# Patient Record
Sex: FEMALE | Race: WHITE | NOT HISPANIC OR LATINO | Employment: FULL TIME | URBAN - METROPOLITAN AREA
[De-identification: names, ages, dates, MRNs, and addresses within clinical notes are randomized per-mention and may not be internally consistent; named-entity substitution may affect disease eponyms.]

---

## 2017-05-02 ENCOUNTER — ALLSCRIPTS OFFICE VISIT (OUTPATIENT)
Dept: OTHER | Facility: OTHER | Age: 32
End: 2017-05-02

## 2017-08-24 ENCOUNTER — ALLSCRIPTS OFFICE VISIT (OUTPATIENT)
Dept: OTHER | Facility: OTHER | Age: 32
End: 2017-08-24

## 2017-08-24 LAB — S PYO AG THROAT QL: NEGATIVE

## 2018-01-12 VITALS
RESPIRATION RATE: 16 BRPM | SYSTOLIC BLOOD PRESSURE: 118 MMHG | HEIGHT: 63 IN | BODY MASS INDEX: 37.74 KG/M2 | TEMPERATURE: 96.7 F | HEART RATE: 76 BPM | WEIGHT: 213 LBS | DIASTOLIC BLOOD PRESSURE: 72 MMHG

## 2018-01-13 ENCOUNTER — ALLSCRIPTS OFFICE VISIT (OUTPATIENT)
Dept: OTHER | Facility: OTHER | Age: 33
End: 2018-01-13

## 2018-01-14 VITALS
BODY MASS INDEX: 35.97 KG/M2 | WEIGHT: 203 LBS | DIASTOLIC BLOOD PRESSURE: 80 MMHG | HEART RATE: 82 BPM | TEMPERATURE: 97.2 F | HEIGHT: 63 IN | RESPIRATION RATE: 18 BRPM | SYSTOLIC BLOOD PRESSURE: 124 MMHG

## 2018-01-14 NOTE — PROGRESS NOTES
Assessment   1  Acute otitis media, unspecified otitis media type (382 9) (H66 90)   2  Other infective acute otitis externa, unspecified laterality (380 10) (H60 399)    Plan   Acute otitis media, unspecified otitis media type, Other infective acute otitis externa,    unspecified laterality    · Cefdinir 300 MG Oral Capsule; TAKE 2 CAPSULES DAILY    Discussion/Summary   The patient was counseled regarding risk factor reductions,-- impressions,-- risks and benefits of treatment options  Possible side effects of new medications were reviewed with the patient/guardian today  The treatment plan was reviewed with the patient/guardian  The patient/guardian understands and agrees with the treatment plan      Provider Comments   Provider Comments:    omnicef for OM and OE coverage dry if no better D for some ETD improved after flush      Chief Complaint   Ears clogged and painful  She is taking drops and clarithromycin from the doctors in  History of Present Illness   HPI: 2 [facility] visit for throat infection, better in throat area ear drops, left b/l, canal infection, no better fever lately np hear well n/v/wheeze/cp          Review of Systems        Constitutional: not feeling poorly-- and-- not feeling tired  Neurological: no fainting  Active Problems   1  Adult BMI 35 0-35 9 kg/sq m (V85 35) (Z68 35)   2  Well adult on routine health check (V70 0) (Z00 00)    Past Medical History   1  Acute maxillary sinusitis (461 0) (J01 00)   2  Acute pharyngitis (462) (J02 9)   3  History of Acute upper respiratory infection (465 9) (J06 9)   4  History of ear pain (V12 49) (Z86 69)   5  History of impacted cerumen (V12 49) (Z86 69)   6  History of ovarian cyst (V13 29) (Z87 42)   7  History of pregnancy (V13 29)   8  History of Other infective otitis externa (380 10) (H60 399)    Social History    · Never a smoker  The social history was reviewed and updated today  Surgical History   1   Denied: History of Reported Prior Surgical / Procedural History    Current Meds    1  Ortho Tri-Cyclen Lo 0 18/0 215/0 25 MG-25 MCG Oral Tablet; TAKE 1 TABLET DAILY; Therapy: 10Jjo2737 to (Renew:92Tgu1919) Recorded    Allergies   1  No Known Drug Allergies    Vitals    Recorded: 49VBH7758 10:59AM   Temperature 97 6 F   Heart Rate 82   Respiration 18   Systolic 966   Diastolic 80   Height 5 ft 3 in   Weight 200 lb    BMI Calculated 35 43   BSA Calculated 1 93     Physical Exam        Constitutional      General appearance: No acute distress, well appearing and well nourished  Eyes      Conjunctiva and lids: No swelling, erythema or discharge  Pupils and irises: Equal, round and reactive to light  Ears, Nose, Mouth, and Throat      External inspection of ears and nose: Normal        Otoscopic examination: Abnormal   The right tympanic membrane was red,-- had a loss of landmarks-- and-- had a diminished light reflex  The left tympanic membrane was red,-- had a loss of landmarks-- and-- had a diminished light reflex  The right external canal was erythematous,-- had desquamation of the epithelium-- and-- had a discharge  The left external canal was erythematous,-- had desquamation of the epithelium-- and-- had a discharge  Nasal mucosa, septum, and turbinates: Normal without edema or erythema  Oropharynx: Normal with no erythema, edema, exudate or lesions  Pulmonary      Respiratory effort: No increased work of breathing or signs of respiratory distress  Auscultation of lungs: Clear to auscultation  Cardiovascular      Auscultation of heart: Normal rate and rhythm, normal S1 and S2, without murmurs  Examination of extremities for edema and/or varicosities: Normal        Carotid pulses: Normal        Abdomen      Abdomen: Non-tender, no masses  Lymphatic      Palpation of lymph nodes in neck: No lymphadenopathy         Musculoskeletal      Gait and station: Normal        Digits and nails: Normal without clubbing or cyanosis  Skin      Skin and subcutaneous tissue: Normal without rashes or lesions  Psychiatric      Mood and affect: Normal           Procedure                    Procedure: ear cleaning due to otorrhea  Indication: tympanic membrane(s) could not be visualized in both ears  Procedure Note: The procedure was performed by the Provider  A otoscope was placed in the ear canal(s) to visualize the ear canal debris  The ear was cleaned by using warm water irrigation-- and-- a wire loop  The procedure was successful  Post-Procedure:      Patient Status: the patient tolerated the procedure well  Complications: there were no complications  Follow-up as needed                 Signatures    Electronically signed by : Manjula Trinidad DO; Jan 13 2018  8:57PM EST                       (Author)

## 2018-01-17 ENCOUNTER — ALLSCRIPTS OFFICE VISIT (OUTPATIENT)
Dept: OTHER | Facility: OTHER | Age: 33
End: 2018-01-17

## 2018-01-18 NOTE — PROGRESS NOTES
Assessment   1  Acute otitis media, unspecified otitis media type (382 9) (H66 90)    Plan   Acute otitis media, unspecified otitis media type, Other infective acute otitis externa,    unspecified laterality    · Cefdinir 300 MG Oral Capsule; TAKE 2 CAPSULES DAILY    Discussion/Summary      Pt advised to finish abx sudafed as directed otc 2 sprays in each nostril in the am   taught [facility]  Chief Complaint   pt c/o ear discomfort in both ears for about a week  on abx but pt states it is still bothering her  ac/cma      History of Present Illness   HPI: pt states she had an ear infection since last Tuesday was seen on sat by a different doc   pt is here to follow up  states its not better      Review of Systems        Constitutional: No fever, no chills, feels well, no tiredness, no recent weight gain or loss  ENT: earache, but-- as noted in HPI  Cardiovascular: no complaints of slow or fast heart rate, no chest pain, no palpitations, no leg claudication or lower extremity edema  Respiratory: no complaints of shortness of breath, no wheezing, no dyspnea on exertion, no orthopnea or PND  Gastrointestinal: no complaints of abdominal pain, no constipation, no nausea or diarrhea, no vomiting, no bloody stools  Genitourinary: no complaints of dysuria, no incontinence, no pelvic pain, no dysmenorrhea, no vaginal discharge or abnormal vaginal bleeding  Musculoskeletal: no complaints of arthralgia, no myalgia, no joint swelling or stiffness, no limb pain or swelling  Active Problems   1  Acute otitis media, unspecified otitis media type (382 9) (H66 90)   2  Adult BMI 35 0-35 9 kg/sq m (V85 35) (Z68 35)   3  Other infective acute otitis externa, unspecified laterality (380 10) (H60 399)   4  Well adult on routine health check (V70 0) (Z00 00)    Past Medical History   1  Acute maxillary sinusitis (461 0) (J01 00)   2  Acute pharyngitis (462) (J02 9)   3   History of Acute upper respiratory infection (465 9) (J06 9)   4  History of ear pain (V12 49) (Z86 69)   5  History of impacted cerumen (V12 49) (Z86 69)   6  History of ovarian cyst (V13 29) (Z87 42)   7  History of pregnancy (V13 29)   8  History of Other infective otitis externa (380 10) (H60 399)  Active Problems And Past Medical History Reviewed: The active problems and past medical history were reviewed and updated today  Family History   Family History Reviewed: The family history was reviewed and updated today  Social History    · Never a smoker  The social history was reviewed and updated today  Surgical History   1  Denied: History of Reported Prior Surgical / Procedural History  Surgical History Reviewed: The surgical history was reviewed and updated today  Current Meds    1  Cefdinir 300 MG Oral Capsule; TAKE 2 CAPSULES DAILY; Therapy: 34NJO2653 to [phone])  Requested for: 51SMK6640; Last     Rx:13Jan2018 Ordered   2  Ortho Tri-Cyclen Lo 0 18/0 215/0 25 MG-25 MCG Oral Tablet; TAKE 1 TABLET DAILY; Therapy: 90Rcp0545 to (Renew:05Nuo5506) Recorded     The medication list was reviewed and updated today  Allergies   1  No Known Drug Allergies    Vitals    Recorded: 59HJA8021 07:11PM   Temperature 97 4 F   Heart Rate 82   Respiration 20   Systolic 318   Diastolic 76   Height 5 ft 3 in   Weight 203 lb    BMI Calculated 35 96   BSA Calculated 1 95     Physical Exam        Constitutional      General appearance: No acute distress, well appearing and well nourished  Eyes      Conjunctiva and lids: No swelling, erythema or discharge  Pupils and irises: Equal, round and reactive to light  Ears, Nose, Mouth, and Throat      Otoscopic examination: Abnormal   The right tympanic membrane was red,-- was bulging-- and-- had decreased mobility  The left tympanic membrane was red,-- was bulging-- and-- had decreased mobility        Oropharynx: Abnormal   The posterior pharynx did not have an exudate  There was enlargement of both tonsils  Pulmonary      Auscultation of lungs: Clear to auscultation  Cardiovascular      Auscultation of heart: Normal rate and rhythm, normal S1 and S2, without murmurs  Abdomen      Abdomen: Non-tender, no masses            Signatures    Electronically signed by : Venice Swenson DO; Jan 17 2018  7:40PM EST                       (Author)

## 2018-01-22 VITALS
WEIGHT: 200 LBS | DIASTOLIC BLOOD PRESSURE: 80 MMHG | HEART RATE: 82 BPM | TEMPERATURE: 97.6 F | HEIGHT: 63 IN | RESPIRATION RATE: 18 BRPM | BODY MASS INDEX: 35.44 KG/M2 | SYSTOLIC BLOOD PRESSURE: 120 MMHG

## 2018-01-23 VITALS
HEIGHT: 63 IN | HEART RATE: 82 BPM | TEMPERATURE: 97.4 F | BODY MASS INDEX: 35.97 KG/M2 | DIASTOLIC BLOOD PRESSURE: 76 MMHG | WEIGHT: 203 LBS | RESPIRATION RATE: 20 BRPM | SYSTOLIC BLOOD PRESSURE: 130 MMHG

## 2018-12-23 ENCOUNTER — AMB VIDEO VISIT (OUTPATIENT)
Dept: OTHER | Facility: HOSPITAL | Age: 33
End: 2018-12-23

## 2018-12-23 ENCOUNTER — AMB VIDEO VISIT (OUTPATIENT)
Dept: URGENT CARE | Facility: CLINIC | Age: 33
End: 2018-12-23

## 2018-12-23 DIAGNOSIS — K52.9 NONINFECTIOUS GASTROENTERITIS, UNSPECIFIED TYPE: Primary | ICD-10-CM

## 2018-12-23 PROCEDURE — EVISIT: Performed by: PHYSICIAN ASSISTANT

## 2018-12-23 RX ORDER — ONDANSETRON 4 MG/1
4 TABLET, FILM COATED ORAL EVERY 8 HOURS PRN
Qty: 20 TABLET | Refills: 0 | Status: SHIPPED | OUTPATIENT
Start: 2018-12-23 | End: 2019-04-03 | Stop reason: ALTCHOICE

## 2018-12-23 NOTE — PROGRESS NOTES
[facility]        NAME: Benedict Dandy is a 34 y o  female  : 1985    MRN: [id number]  DATE: 2019  TIME: 6:20 PM    Assessment and Plan   Noninfectious gastroenteritis, unspecified type [K52 9]  1  Noninfectious gastroenteritis, unspecified type  ondansetron (ZOFRAN) 4 mg tablet         Patient Instructions   Gastroenteritis:   -The patient is likely suffering from a "stomach flu" will prescribe zofran 4mg to be taken as needed every 8 hours for nausea  We discussed progression of her diet  She should stay well hydrated and stick to clear liquids until her symptoms improve and can then slowly progress her diet as tolerated  We discussed that if she experiences worsening symptoms, dizziness, abdominal pain, fever or chills she should go immediately to the ED  She verbalizes understanding and agrees with the treatment plan  Follow up with PCP in 3-5 days  Proceed to  ER if symptoms worsen  Chief Complaint   No chief complaint on file  History of Present Illness       The patient presents today for a 6 hour hx of nausea, vomiting and diarrhea  She states that last night around 3am she began to experience nausea and began to vomit  She states that since then she has had three episodes of diarrhea and over ten episodes of vomiting  She states that she has been unable to tolerate PO intake  She denies fever, chills, hemoptysis, melena, hematochezia, abdominal pains, dizziness, weakness  She has not tried OTC interventions  She denies sick contacts  She states that she has no PMH and is currently on birth control  She denies drug allergies  Review of Systems   Review of Systems   Constitutional: Positive for appetite change  Negative for activity change, chills, diaphoresis, fatigue and fever  HENT: Negative  Eyes: Negative for visual disturbance  Respiratory: Negative for cough, choking, chest tightness, shortness of breath, wheezing and stridor  Cardiovascular: Negative for chest pain, palpitations and leg swelling  Gastrointestinal: Positive for diarrhea, nausea and vomiting  Negative for abdominal distention, abdominal pain, anal bleeding, blood in stool, constipation and rectal pain  Genitourinary: Negative for dysuria, enuresis, flank pain, frequency, menstrual problem, pelvic pain, vaginal bleeding, vaginal discharge and vaginal pain  Musculoskeletal: Negative for arthralgias, myalgias, neck pain and neck stiffness  Skin: Negative for rash  Allergic/Immunologic: Negative for immunocompromised state  Neurological: Negative for dizziness, weakness, light-headedness and numbness  Hematological: Negative for adenopathy  Does not bruise/bleed easily  Current Medications       Current Outpatient Prescriptions:     ondansetron (ZOFRAN) 4 mg tablet, Take 1 tablet (4 mg total) by mouth every 8 (eight) hours as needed for nausea or vomiting, Disp: 20 tablet, Rfl: 0    Current Allergies     Allergies as of 12/23/2018    (No Known Allergies)            The following portions of the patient's history were reviewed and updated as appropriate: allergies, current medications, past family history, past medical history, past social history, past surgical history and problem list      No past medical history on file  No past surgical history on file  No family history on file  Medications have been verified  Objective   There were no vitals taken for this visit  Physical Exam     Physical Exam   Constitutional: She appears well-developed and well-nourished  No distress  Abdominal:   Patient was informed to palpate all four quadrants of her belly with deep pressure and states there is no abdominal tenderness   Skin: She is not diaphoretic

## 2018-12-23 NOTE — CARE ANYWHERE EVISITS
Visit Summary for Estrada Powell - Gender: Female - Date of Birth: [id number]  Date: [id number] - Duration: 5 minutes  Patient: Estrada Powell  Provider: Barb Rodrigues PA-C    Patient Contact Information  Address  [address]; [facility] [place] [facility] [place] [address]  [phone]    Visit Topics  throwing up/stomach issues all night [Added By: Self - 2018-12-23]    Conversation Transcripts     [Notification] You are connected with Barb Rodrigues PA-C, Physician Assistant  [Notification] Estrada Powell is located in [place]  [Notification] Estrada Powell has shared health history         Diagnosis    Procedures  Value: 79905 Code: CPT-4 UNLISTED E&M SERVICE    Medications Prescribed    No prescriptions ordered    Electronically signed by: Tatiana Lopez(NPI [id number])

## 2018-12-23 NOTE — PATIENT INSTRUCTIONS
Acute Nausea and Vomiting   AMBULATORY CARE:   Acute nausea and vomiting  starts suddenly, gets worse quickly, and lasts a short time  Common causes include pregnancy, alcohol, infection, and medicines  A head injury, heart attack, or inner ear imbalance can also cause acute nausea and vomiting  Seek care immediately if:   · You see blood in your vomit or your bowel movements  · You have sudden, severe pain in your chest and upper abdomen after hard vomiting or retching  · You have swelling in your neck and chest      · You are dizzy, cold, and thirsty and your eyes and mouth are dry  · You are urinating very little or not at all  · You have muscle weakness, leg cramps, and trouble breathing  · Your heart is beating much faster than normal      · You continue to vomit for more than 48 hours  Contact your healthcare provider if:   · You have frequent dry heaves (vomiting but nothing comes out)  · Your nausea and vomiting does not get better or go away after you use medicine  · You have questions or concerns about your condition or treatment  Treatment for acute nausea and vomiting  may include medicines to calm your stomach and stop the vomiting  You may need IV fluids if you are dehydrated  Prevent or manage acute nausea and vomiting:   · Do not drink alcohol  Alcohol may upset or irritate your stomach  Too much alcohol can also cause acute nausea and vomiting  · Control stress  Headaches due to stress may cause nausea and vomiting  Find ways to relax and manage your stress  Get more rest and sleep  · Drink more liquids as directed  Vomiting can lead to dehydration  It is important to drink more liquids to help replace lost body fluids  Ask your healthcare provider how much liquid to drink each day and which liquids are best for you  Your provider may recommend that you drink an oral rehydration solution (ORS)   ORS contains water, salts, and sugar that are needed to replace the lost body fluids  Ask what kind of ORS to use, how much to drink, and where to get it  · Eat smaller meals, more often  Eat small amounts of food every 2 to 3 hours, even if you are not hungry  Food in your stomach may decrease your nausea  · Talk to your healthcare provider before you take over-the-counter (OTC) medicines  These medicines can cause serious problems if you use certain other medicines, or you have a medical condition  You may have problems if you use too much or use them for longer than the label says  Follow directions on the label carefully  Follow up with your healthcare provider as directed:  Write down your questions so you remember to ask them during your visits  © 2017 [address] Information is for End User's use only and may not be sold, redistributed or otherwise used for commercial purposes  All illustrations and images included in CareNotes® are the copyrighted property of A D [employer] M [employer] Inc  or Fabian Gill  The above information is an  only  It is not intended as medical advice for individual conditions or treatments  Talk to your doctor, nurse or pharmacist before following any medical regimen to see if it is safe and effective for you  Gastroenteritis:   -The patient is likely suffering from a "stomach flu" will prescribe zofran 4mg to be taken as needed every 8 hours for nausea  We discussed progression of her diet  She should stay well hydrated and stick to clear liquids until her symptoms improve and can then slowly progress her diet as tolerated  We discussed that if she experiences worsening symptoms, dizziness, abdominal pain, fever or chills she should go immediately to the ED  She verbalizes understanding and agrees with the treatment plan

## 2019-02-25 ENCOUNTER — OFFICE VISIT (OUTPATIENT)
Dept: FAMILY MEDICINE CLINIC | Facility: CLINIC | Age: 34
End: 2019-02-25
Payer: COMMERCIAL

## 2019-02-25 VITALS
BODY MASS INDEX: 35.44 KG/M2 | HEART RATE: 66 BPM | HEIGHT: 64 IN | DIASTOLIC BLOOD PRESSURE: 70 MMHG | WEIGHT: 207.6 LBS | TEMPERATURE: 98.3 F | RESPIRATION RATE: 16 BRPM | SYSTOLIC BLOOD PRESSURE: 132 MMHG

## 2019-02-25 DIAGNOSIS — J02.9 ACUTE PHARYNGITIS, UNSPECIFIED ETIOLOGY: Primary | ICD-10-CM

## 2019-02-25 LAB — S PYO AG THROAT QL: NEGATIVE

## 2019-02-25 PROCEDURE — 99213 OFFICE O/P EST LOW 20 MIN: CPT | Performed by: NURSE PRACTITIONER

## 2019-02-25 PROCEDURE — 3008F BODY MASS INDEX DOCD: CPT | Performed by: NURSE PRACTITIONER

## 2019-02-25 PROCEDURE — 87880 STREP A ASSAY W/OPTIC: CPT | Performed by: NURSE PRACTITIONER

## 2019-02-25 RX ORDER — NORGESTIMATE AND ETHINYL ESTRADIOL 7DAYSX3 LO
1 KIT ORAL DAILY
COMMUNITY
End: 2021-12-09

## 2019-02-25 RX ORDER — AMOXICILLIN 875 MG/1
875 TABLET, COATED ORAL 2 TIMES DAILY
Qty: 20 TABLET | Refills: 0 | Status: SHIPPED | OUTPATIENT
Start: 2019-02-25 | End: 2019-03-07

## 2019-02-25 NOTE — PATIENT INSTRUCTIONS
Take medication with food  It is important that you take the entire course of antibiotics prescribed  May also take a probiotic of your choice to maintain healthy GI khalida  Can take some probiotic and yogurt with the medication  Gargle with warm salt water for 5 minutes every 4 hours  Drink plenty of fluids at least 6 glasses of water a day  Can use some honey lemon tea  Call or follow up if symptoms are not better in 7 days  Supportive care discussed and advised  Follow up for no improvement and worsening of conditions  Patient advised and educated when to see immediate medical care

## 2019-02-25 NOTE — PROGRESS NOTES
Assessment/Plan:         Diagnoses and all orders for this visit:    Acute pharyngitis, unspecified etiology  -     amoxicillin (AMOXIL) 875 mg tablet; Take 1 tablet (875 mg total) by mouth 2 (two) times a day for 10 days  -     POCT rapid strepA    Other orders  -     norgestimate-ethinyl estradiol (ORTHO TRI-CYCLEN LO) 0 18/0 215/0 25 MG-25 MCG per tablet; Take 1 tablet by mouth daily          BMI Counseling: Body mass index is 35 63 kg/m²  Discussed the patient's BMI with her  The BMI is above average  BMI counseling and education was provided to the patient  Nutrition recommendations include reducing portion sizes, decreasing overall calorie intake, 3-5 servings of fruits/vegetables daily, reducing fast food intake, reducing intake of saturated fat and trans fat and reducing intake of cholesterol  Patient Instructions: Take medication with food  It is important that you take the entire course of antibiotics prescribed  May also take a probiotic of your choice to maintain healthy GI khalida  Can take some probiotic and yogurt with the medication  Gargle with warm salt water for 5 minutes every 4 hours  Drink plenty of fluids at least 6 glasses of water a day  Can use some honey lemon tea  Call or follow up if symptoms are not better in 7 days  Supportive care discussed and advised  Follow up for no improvement and worsening of conditions  Patient advised and educated when to see immediate medical care  Return if symptoms worsen or fail to improve  Recent Results (from the past 24 hour(s))   POCT rapid strepA    Collection Time: 02/25/19  2:12 PM   Result Value Ref Range     RAPID STREP A Negative Negative         Subjective:      Patient ID: Elisa Vee is a 34 y o  female  Chief Complaint   Patient presents with    Sore Throat     started on Saturday    jmcma    Earache     Rt side       HPI  Patient stated that started with sore throat on 2/23 and stated that symptoms getting worse  Stated that sore throat progressed to right earache  Denies fever, chills, and sob  The following portions of the patient's history were reviewed and updated as appropriate: allergies, current medications, past family history, past medical history, past social history, past surgical history and problem list       Review of Systems   Constitutional: Negative for chills, fatigue and fever  HENT: Positive for ear pain and sore throat  Negative for congestion, ear discharge, facial swelling, hearing loss, mouth sores, nosebleeds, postnasal drip, rhinorrhea, sinus pressure, sinus pain, sneezing, trouble swallowing and voice change  Respiratory: Negative for cough, chest tightness, shortness of breath and wheezing  Cardiovascular: Negative  Gastrointestinal: Negative for abdominal pain, constipation, diarrhea and nausea  Neurological: Negative for dizziness, weakness, light-headedness and headaches  Objective:    Social History     Tobacco Use   Smoking Status Never Smoker   Smokeless Tobacco Never Used       Allergies: No Known Allergies    Vitals:  /70   Pulse 66   Temp 98 3 °F (36 8 °C)   Resp 16   Ht 5' 4" (1 626 m)   Wt 94 2 kg (207 lb 9 6 oz)   LMP 02/04/2019 (Approximate)   BMI 35 63 kg/m²     Current Outpatient Medications   Medication Sig Dispense Refill    norgestimate-ethinyl estradiol (ORTHO TRI-CYCLEN LO) 0 18/0 215/0 25 MG-25 MCG per tablet Take 1 tablet by mouth daily      amoxicillin (AMOXIL) 875 mg tablet Take 1 tablet (875 mg total) by mouth 2 (two) times a day for 10 days 20 tablet 0    ondansetron (ZOFRAN) 4 mg tablet Take 1 tablet (4 mg total) by mouth every 8 (eight) hours as needed for nausea or vomiting (Patient not taking: Reported on 2/25/2019) 20 tablet 0     No current facility-administered medications for this visit  Physical Exam   Constitutional: She is oriented to person, place, and time   She appears well-developed and well-nourished  HENT:   Head: Normocephalic  Right Ear: External ear and ear canal normal  Tympanic membrane is bulging  Tympanic membrane is not retracted  Left Ear: Tympanic membrane, external ear and ear canal normal  Tympanic membrane is not retracted  Nose: Nose normal  Right sinus exhibits no maxillary sinus tenderness and no frontal sinus tenderness  Left sinus exhibits no maxillary sinus tenderness and no frontal sinus tenderness  Mouth/Throat: Mucous membranes are normal  Posterior oropharyngeal erythema present  Neck: Neck supple  Cardiovascular: Normal rate, regular rhythm and normal heart sounds  Pulmonary/Chest: Effort normal and breath sounds normal    Abdominal: Normal appearance and bowel sounds are normal  There is no hepatosplenomegaly  There is no tenderness  There is no rebound  Musculoskeletal: Normal range of motion  Lymphadenopathy:     She has cervical adenopathy  Right cervical: Superficial cervical adenopathy present  No posterior cervical adenopathy present  Left cervical: Superficial cervical adenopathy present  No posterior cervical adenopathy present  Neurological: She is alert and oriented to person, place, and time  Skin: Skin is warm and dry  Psychiatric: She has a normal mood and affect  Her behavior is normal  Judgment and thought content normal    Vitals reviewed                    CALLIE Brandon

## 2019-03-19 ENCOUNTER — OFFICE VISIT (OUTPATIENT)
Dept: FAMILY MEDICINE CLINIC | Facility: CLINIC | Age: 34
End: 2019-03-19
Payer: COMMERCIAL

## 2019-03-19 VITALS
BODY MASS INDEX: 35.17 KG/M2 | HEIGHT: 64 IN | SYSTOLIC BLOOD PRESSURE: 130 MMHG | HEART RATE: 72 BPM | DIASTOLIC BLOOD PRESSURE: 74 MMHG | TEMPERATURE: 98.2 F | WEIGHT: 206 LBS | RESPIRATION RATE: 16 BRPM

## 2019-03-19 DIAGNOSIS — J02.9 SORE THROAT: Primary | ICD-10-CM

## 2019-03-19 DIAGNOSIS — R09.82 POST-NASAL DRIP: ICD-10-CM

## 2019-03-19 PROCEDURE — 99213 OFFICE O/P EST LOW 20 MIN: CPT | Performed by: NURSE PRACTITIONER

## 2019-03-19 RX ORDER — FLUTICASONE PROPIONATE 50 MCG
2 SPRAY, SUSPENSION (ML) NASAL DAILY
Qty: 16 G | Refills: 2 | Status: SHIPPED | OUTPATIENT
Start: 2019-03-19 | End: 2020-06-09 | Stop reason: ALTCHOICE

## 2019-03-19 NOTE — PROGRESS NOTES
Assessment/Plan:  Post nasal drip and acid reflux in differential and discussed medications how to use and if no improvement, will follow up with ENT  Start flonase and if symptoms improved, will continue that  If after starting flonase, symptoms not improved, will start zantac or pepcid in morning empty stomach  If still symptoms not resolved with above, will follow up with ENT  Diagnoses and all orders for this visit:    Sore throat  -     fluticasone (FLONASE) 50 mcg/act nasal spray; 2 sprays into each nostril daily  -     Ambulatory Referral to Otolaryngology; Future    Post-nasal drip  -     fluticasone (FLONASE) 50 mcg/act nasal spray; 2 sprays into each nostril daily  -     Ambulatory Referral to Otolaryngology; Future         Patient Instructions:  Start flonase and if symptoms improved, will continue that  If after starting flonase, symptoms not improved, will start zantac or pepcid in morning empty stomach  If still symptoms not resolved with above, will follow up with ENT  Supportive care discussed and advised  Follow up for no improvement and worsening of conditions  Patient advised and educated when to see immediate medical care  Return if symptoms worsen or fail to improve  Subjective:      Patient ID: Dena Giang is a 34 y o  female  Chief Complaint   Patient presents with    Sore Throat     still not cleared up-[facility]  Patient was seen in office on 3/25 for acute pharyngitis and stated that feeling much better but still having lingering sore throat  Stated that it was worse 2 days ago and feeling better today  Stated that swallowing lot of saliva  Had h/o acid reflux when she was pregnant  Denies fever, sob, chest pain and swallowing difficulty  Denies any weight loss      Vitals:  /74   Pulse 72   Temp 98 2 °F (36 8 °C)   Resp 16   Ht 5' 4" (1 626 m)   Wt 93 4 kg (206 lb)   BMI 35 36 kg/m²     The following portions of the patient's history were reviewed and updated as appropriate: allergies, current medications, past family history, past medical history, past social history, past surgical history and problem list       Review of Systems   Constitutional: Negative for activity change, appetite change, chills, diaphoresis, fatigue, fever and unexpected weight change  HENT: Positive for sore throat  Negative for congestion, ear discharge, ear pain, facial swelling, hearing loss, mouth sores, nosebleeds, postnasal drip, rhinorrhea, sinus pressure, sinus pain, sneezing, trouble swallowing and voice change  Respiratory: Negative for cough, chest tightness, shortness of breath and wheezing  Cardiovascular: Negative  Gastrointestinal: Negative for abdominal pain, constipation, diarrhea and nausea  Genitourinary: Negative  Musculoskeletal: Negative  Skin: Negative  Neurological: Negative for dizziness, weakness, light-headedness and headaches  Psychiatric/Behavioral: Negative  Objective:    Social History     Tobacco Use   Smoking Status Never Smoker   Smokeless Tobacco Never Used       Allergies: No Known Allergies      Current Outpatient Medications   Medication Sig Dispense Refill    norgestimate-ethinyl estradiol (ORTHO TRI-CYCLEN LO) 0 18/0 215/0 25 MG-25 MCG per tablet Take 1 tablet by mouth daily      fluticasone (FLONASE) 50 mcg/act nasal spray 2 sprays into each nostril daily 16 g 2    ondansetron (ZOFRAN) 4 mg tablet Take 1 tablet (4 mg total) by mouth every 8 (eight) hours as needed for nausea or vomiting (Patient not taking: Reported on 2/25/2019) 20 tablet 0     No current facility-administered medications for this visit  Physical Exam   Constitutional: She is oriented to person, place, and time  She appears well-developed and well-nourished  HENT:   Head: Normocephalic     Right Ear: Tympanic membrane, external ear and ear canal normal    Left Ear: Tympanic membrane, external ear and ear canal normal    Nose: Nose normal  Right sinus exhibits no maxillary sinus tenderness and no frontal sinus tenderness  Left sinus exhibits no maxillary sinus tenderness and no frontal sinus tenderness  Mouth/Throat: Oropharynx is clear and moist and mucous membranes are normal    Post nasal drip noted without any redness of posterior oropharynx and without any discharge and swelling of tonsils  Right tonsil is larger than left    Neck: Neck supple  Cardiovascular: Normal rate, regular rhythm and normal heart sounds  Pulmonary/Chest: Effort normal and breath sounds normal    Abdominal: Normal appearance and bowel sounds are normal  There is no hepatosplenomegaly  There is no tenderness  There is no rebound  Musculoskeletal: Normal range of motion  Lymphadenopathy:        Right cervical: No superficial cervical and no posterior cervical adenopathy present  Left cervical: No superficial cervical and no posterior cervical adenopathy present  Neurological: She is alert and oriented to person, place, and time  Skin: Skin is warm and dry  Psychiatric: She has a normal mood and affect  Her behavior is normal  Judgment and thought content normal    Vitals reviewed                    CALLIE Iglesias

## 2019-03-19 NOTE — PATIENT INSTRUCTIONS
Start flonase and if symptoms improved, will continue that  If after starting flonase, symptoms not improved, will start zantac or pepcid in morning empty stomach  If still symptoms not resolved with above, will follow up with ENT  Supportive care discussed and advised  Follow up for no improvement and worsening of conditions  Patient advised and educated when to see immediate medical care

## 2019-04-03 ENCOUNTER — OFFICE VISIT (OUTPATIENT)
Dept: FAMILY MEDICINE CLINIC | Facility: CLINIC | Age: 34
End: 2019-04-03
Payer: COMMERCIAL

## 2019-04-03 VITALS
BODY MASS INDEX: 35.34 KG/M2 | TEMPERATURE: 98 F | HEIGHT: 64 IN | WEIGHT: 207 LBS | DIASTOLIC BLOOD PRESSURE: 72 MMHG | RESPIRATION RATE: 16 BRPM | SYSTOLIC BLOOD PRESSURE: 120 MMHG | HEART RATE: 72 BPM

## 2019-04-03 DIAGNOSIS — J01.00 ACUTE NON-RECURRENT MAXILLARY SINUSITIS: Primary | ICD-10-CM

## 2019-04-03 PROCEDURE — 99213 OFFICE O/P EST LOW 20 MIN: CPT | Performed by: FAMILY MEDICINE

## 2019-04-03 PROCEDURE — 3008F BODY MASS INDEX DOCD: CPT | Performed by: FAMILY MEDICINE

## 2019-04-03 PROCEDURE — 1036F TOBACCO NON-USER: CPT | Performed by: FAMILY MEDICINE

## 2019-04-03 RX ORDER — FLUCONAZOLE 150 MG/1
TABLET ORAL
Refills: 0 | COMMUNITY
Start: 2019-04-02 | End: 2019-04-03 | Stop reason: ALTCHOICE

## 2019-04-03 RX ORDER — AZITHROMYCIN 250 MG/1
TABLET, FILM COATED ORAL
Qty: 6 TABLET | Refills: 0 | Status: SHIPPED | OUTPATIENT
Start: 2019-04-03 | End: 2019-04-07

## 2019-11-11 ENCOUNTER — OFFICE VISIT (OUTPATIENT)
Dept: FAMILY MEDICINE CLINIC | Facility: CLINIC | Age: 34
End: 2019-11-11
Payer: COMMERCIAL

## 2019-11-11 VITALS
RESPIRATION RATE: 16 BRPM | WEIGHT: 203 LBS | DIASTOLIC BLOOD PRESSURE: 80 MMHG | SYSTOLIC BLOOD PRESSURE: 112 MMHG | TEMPERATURE: 98 F | HEIGHT: 63 IN | OXYGEN SATURATION: 98 % | HEART RATE: 66 BPM | BODY MASS INDEX: 35.97 KG/M2

## 2019-11-11 DIAGNOSIS — R53.83 FATIGUE, UNSPECIFIED TYPE: ICD-10-CM

## 2019-11-11 DIAGNOSIS — Z13.1 SCREENING FOR DIABETES MELLITUS (DM): ICD-10-CM

## 2019-11-11 DIAGNOSIS — Z23 IMMUNIZATION DUE: ICD-10-CM

## 2019-11-11 DIAGNOSIS — Z13.83 SCREENING FOR CARDIOVASCULAR, RESPIRATORY, AND GENITOURINARY DISEASES: ICD-10-CM

## 2019-11-11 DIAGNOSIS — M79.10 MYALGIA: ICD-10-CM

## 2019-11-11 DIAGNOSIS — R63.5 ABNORMAL WEIGHT GAIN: ICD-10-CM

## 2019-11-11 DIAGNOSIS — R35.1 NOCTURIA: ICD-10-CM

## 2019-11-11 DIAGNOSIS — Z00.00 HEALTHCARE MAINTENANCE: Primary | ICD-10-CM

## 2019-11-11 DIAGNOSIS — Z13.6 SCREENING FOR CARDIOVASCULAR, RESPIRATORY, AND GENITOURINARY DISEASES: ICD-10-CM

## 2019-11-11 DIAGNOSIS — Z13.89 SCREENING FOR CARDIOVASCULAR, RESPIRATORY, AND GENITOURINARY DISEASES: ICD-10-CM

## 2019-11-11 PROBLEM — K52.9 NONINFECTIOUS GASTROENTERITIS: Status: RESOLVED | Noted: 2018-12-23 | Resolved: 2019-11-11

## 2019-11-11 PROCEDURE — 93000 ELECTROCARDIOGRAM COMPLETE: CPT | Performed by: FAMILY MEDICINE

## 2019-11-11 PROCEDURE — 90686 IIV4 VACC NO PRSV 0.5 ML IM: CPT

## 2019-11-11 PROCEDURE — 90471 IMMUNIZATION ADMIN: CPT

## 2019-11-11 PROCEDURE — 99395 PREV VISIT EST AGE 18-39: CPT | Performed by: FAMILY MEDICINE

## 2019-11-11 NOTE — PROGRESS NOTES
Assessment/Plan:    No problem-specific Assessment & Plan notes found for this encounter  cpe done    occas stomach ache, GGT  Post nasal drip and geographic tongue, suspect viral  Labs ordered, cc to [facility] in [place]  EKG normal, fatigue, copy given to pt       Diagnoses and all orders for this visit:    Healthcare maintenance    Fatigue, unspecified type  -     Vitamin D 25 hydroxy; Future  -     CBC and differential; Future  -     TSH, 3rd generation; Future  -     T4, free; Future  -     Gamma GT; Future  -     POCT ECG    Myalgia  -     Phosphorus; Future  -     Magnesium; Future  -     PTH, intact; Future    Abnormal weight gain  -     Hemoglobin A1C; Future    Nocturia  -     Hemoglobin A1C; Future  -     UA w Reflex to Microscopic w Reflex to Culture -Lab Collect; Future  -     Microalbumin / creatinine urine ratio; Future    Screening for diabetes mellitus (DM)  -     Comprehensive metabolic panel; Future    Screening for cardiovascular, respiratory, and genitourinary diseases  -     Lipid Panel with Direct LDL reflex; Future    Immunization due  -     influenza vaccine, 9485-0510, quadrivalent, 0 5 mL, preservative-free, for adult and pediatric patients 6 mos+ (AFLURIA, FLUARIX, FLULAVAL, FLUZONE)        Return if symptoms worsen or fail to improve  Subjective:      Patient ID: Goyo Baum is a 30 y o  female      Chief Complaint   Patient presents with    Physical Exam     vfrma    Sore Throat       HPI    Tries to follow diet, healthy mostly  Brian Crouch to lose wt  Trying to exercise, active at job, lots of walking  On bcp  No herbals  Periods were heavy before bcp  Also frequent  Cramps and aches in legs  Fatigue  Some nocturia  2 4yo child  No GDM  Has gained wt, 50# in last few years     Has had sore throat  About 3d  Spots on tongue, tender, few days  Around child with coxsackie virus  No fever  Some nausea  No diarrhea  No hand rashes    The following portions of the patient's history were reviewed and updated as appropriate: allergies, current medications, past family history, past medical history, past social history, past surgical history and problem list     Review of Systems   Respiratory: Negative for shortness of breath  Cardiovascular: Negative for chest pain  Current Outpatient Medications   Medication Sig Dispense Refill    fluticasone (FLONASE) 50 mcg/act nasal spray 2 sprays into each nostril daily 16 g 2    norgestimate-ethinyl estradiol (ORTHO TRI-CYCLEN LO) 0 18/0 215/0 25 MG-25 MCG per tablet Take 1 tablet by mouth daily       No current facility-administered medications for this visit  Objective:    /80   Pulse 66   Temp 98 °F (36 7 °C)   Resp 16   Ht 5' 3" (1 6 m)   Wt 92 1 kg (203 lb)   LMP 11/09/2019 (Exact Date)   SpO2 98%   BMI 35 96 kg/m²        Physical Exam   Constitutional: She appears well-developed  No distress  HENT:   Head: Normocephalic  Right Ear: External ear normal    Left Ear: External ear normal    Mouth/Throat: No oropharyngeal exudate  psot nasal drip, tongue geographic   Eyes: Conjunctivae are normal  No scleral icterus  Neck: Neck supple  No thyromegaly present  Cardiovascular: Normal rate, regular rhythm and intact distal pulses  Pulmonary/Chest: Effort normal and breath sounds normal  No respiratory distress  She has no wheezes  Abdominal: Soft  Bowel sounds are normal  She exhibits no mass  There is no tenderness  Musculoskeletal: She exhibits no edema or deformity  Neurological: She is alert  Skin: Skin is warm and dry  Capillary refill takes less than 2 seconds  No rash noted  Psychiatric: Her behavior is normal  Thought content normal    Nursing note and vitals reviewed               Isaiah Malave DO

## 2019-11-21 LAB
25(OH)D3+25(OH)D2 SERPL-MCNC: 45.7 NG/ML (ref 30–100)
ALBUMIN SERPL-MCNC: 4.3 G/DL (ref 3.5–5.5)
ALBUMIN/CREAT UR: 2.3 MG/G CREAT (ref 0–30)
ALBUMIN/GLOB SERPL: 1.4 {RATIO} (ref 1.2–2.2)
ALP SERPL-CCNC: 61 IU/L (ref 39–117)
ALT SERPL-CCNC: 9 IU/L (ref 0–32)
APPEARANCE UR: CLEAR
AST SERPL-CCNC: 14 IU/L (ref 0–40)
BACTERIA URNS QL MICRO: NORMAL
BASOPHILS # BLD AUTO: 0 X10E3/UL (ref 0–0.2)
BASOPHILS NFR BLD AUTO: 0 %
BILIRUB SERPL-MCNC: 0.2 MG/DL (ref 0–1.2)
BILIRUB UR QL STRIP: NEGATIVE
BUN SERPL-MCNC: 13 MG/DL (ref 6–20)
BUN/CREAT SERPL: 19 (ref 9–23)
CALCIUM SERPL-MCNC: 9.6 MG/DL (ref 8.7–10.2)
CHLORIDE SERPL-SCNC: 100 MMOL/L (ref 96–106)
CHOLEST SERPL-MCNC: 176 MG/DL (ref 100–199)
CO2 SERPL-SCNC: 26 MMOL/L (ref 20–29)
COLOR UR: YELLOW
CREAT SERPL-MCNC: 0.69 MG/DL (ref 0.57–1)
CREAT UR-MCNC: 141 MG/DL
EOSINOPHIL # BLD AUTO: 0.2 X10E3/UL (ref 0–0.4)
EOSINOPHIL NFR BLD AUTO: 3 %
EPI CELLS #/AREA URNS HPF: NORMAL /HPF (ref 0–10)
ERYTHROCYTE [DISTWIDTH] IN BLOOD BY AUTOMATED COUNT: 13.2 % (ref 12.3–15.4)
GGT SERPL-CCNC: 10 IU/L (ref 0–60)
GLOBULIN SER-MCNC: 3 G/DL (ref 1.5–4.5)
GLUCOSE SERPL-MCNC: 79 MG/DL (ref 65–99)
GLUCOSE UR QL: NEGATIVE
HBA1C MFR BLD: 5.3 % (ref 4.8–5.6)
HCT VFR BLD AUTO: 42.8 % (ref 34–46.6)
HDLC SERPL-MCNC: 59 MG/DL
HGB BLD-MCNC: 14.3 G/DL (ref 11.1–15.9)
HGB UR QL STRIP: NEGATIVE
IMM GRANULOCYTES # BLD: 0 X10E3/UL (ref 0–0.1)
IMM GRANULOCYTES NFR BLD: 0 %
KETONES UR QL STRIP: NEGATIVE
LDLC SERPL CALC-MCNC: 89 MG/DL (ref 0–99)
LEUKOCYTE ESTERASE UR QL STRIP: NEGATIVE
LYMPHOCYTES # BLD AUTO: 2.5 X10E3/UL (ref 0.7–3.1)
LYMPHOCYTES NFR BLD AUTO: 31 %
MAGNESIUM SERPL-MCNC: 2.2 MG/DL (ref 1.6–2.3)
MCH RBC QN AUTO: 29.7 PG (ref 26.6–33)
MCHC RBC AUTO-ENTMCNC: 33.4 G/DL (ref 31.5–35.7)
MCV RBC AUTO: 89 FL (ref 79–97)
MICRO URNS: NORMAL
MICRO URNS: NORMAL
MICROALBUMIN UR-MCNC: 3.2 UG/ML
MICRODELETION SYND BLD/T FISH: NORMAL
MONOCYTES # BLD AUTO: 0.5 X10E3/UL (ref 0.1–0.9)
MONOCYTES NFR BLD AUTO: 6 %
MUCOUS THREADS URNS QL MICRO: PRESENT
NEUTROPHILS # BLD AUTO: 5 X10E3/UL (ref 1.4–7)
NEUTROPHILS NFR BLD AUTO: 60 %
NITRITE UR QL STRIP: NEGATIVE
PH UR STRIP: 6 [PH] (ref 5–7.5)
PHOSPHATE SERPL-MCNC: 2.9 MG/DL (ref 2.5–4.5)
PLATELET # BLD AUTO: 372 X10E3/UL (ref 150–450)
POTASSIUM SERPL-SCNC: 4.7 MMOL/L (ref 3.5–5.2)
PROT SERPL-MCNC: 7.3 G/DL (ref 6–8.5)
PROT UR QL STRIP: NEGATIVE
PTH-INTACT SERPL-MCNC: 24 PG/ML (ref 15–65)
RBC # BLD AUTO: 4.82 X10E6/UL (ref 3.77–5.28)
RBC #/AREA URNS HPF: NORMAL /HPF (ref 0–2)
SL AMB EGFR AFRICAN AMERICAN: 133 ML/MIN/1.73
SL AMB EGFR NON AFRICAN AMERICAN: 116 ML/MIN/1.73
SL AMB URINALYSIS REFLEX: NORMAL
SODIUM SERPL-SCNC: 139 MMOL/L (ref 134–144)
SP GR UR: 1.02 (ref 1–1.03)
T4 FREE SERPL-MCNC: 1 NG/DL (ref 0.82–1.77)
TRIGL SERPL-MCNC: 141 MG/DL (ref 0–149)
TSH SERPL DL<=0.005 MIU/L-ACNC: 1.41 UIU/ML (ref 0.45–4.5)
UROBILINOGEN UR STRIP-ACNC: 0.2 MG/DL (ref 0.2–1)
WBC # BLD AUTO: 8.3 X10E3/UL (ref 3.4–10.8)
WBC #/AREA URNS HPF: NORMAL /HPF (ref 0–5)

## 2020-03-14 ENCOUNTER — AMB VIDEO VISIT (OUTPATIENT)
Dept: URGENT CARE | Facility: CLINIC | Age: 35
End: 2020-03-14

## 2020-03-14 DIAGNOSIS — J01.00 ACUTE NON-RECURRENT MAXILLARY SINUSITIS: Primary | ICD-10-CM

## 2020-03-14 RX ORDER — PSEUDOEPHEDRINE HCL 60 MG/1
60 TABLET ORAL EVERY 6 HOURS PRN
Qty: 30 TABLET | Refills: 0 | Status: SHIPPED | OUTPATIENT
Start: 2020-03-14 | End: 2020-06-09 | Stop reason: ALTCHOICE

## 2020-03-14 RX ORDER — AMOXICILLIN AND CLAVULANATE POTASSIUM 875; 125 MG/1; MG/1
1 TABLET, FILM COATED ORAL EVERY 12 HOURS SCHEDULED
Qty: 14 TABLET | Refills: 0 | Status: SHIPPED | OUTPATIENT
Start: 2020-03-14 | End: 2020-03-21

## 2020-03-14 NOTE — PROGRESS NOTES
Assessment/Plan   Diagnoses and all orders for this visit:    Acute non-recurrent maxillary sinusitis  -     amoxicillin-clavulanate (AUGMENTIN) 875-125 mg per tablet; Take 1 tablet by mouth every 12 (twelve) hours for 7 days  -     pseudoephedrine (SUDAFED) 60 mg tablet; Take 1 tablet (60 mg total) by mouth every 6 (six) hours as needed for congestion     Prescriptions sent to the pharmacy for Augmentin and pseudoephedrine-use as directed  Saline nasal spray several times daily, Flonase in a m , cool mist humidifier, Tylenol/ibuprofen as needed for fever or pain  Subjective   Patient ID: Sedrick Segundo is a 30 y o  female  There were no vitals filed for this visit  HPI  The patient is a 22-year-old female who presents via video visit with cold symptoms that have been present for approximately 1 week  Positive nasal and sinus congestion that has worsened  Maxillary sinus pressure and pain  Negative headache or dizziness  Negative fever or chills  Positive postnasal drip  Negative sore throat  Negative cough  Negative abdominal pain, nausea, vomiting or diarrhea  She states that she has been taking DayQuil for her symptoms without improvement  Review of Systems   Constitutional: Negative for activity change, chills, fatigue and fever  HENT: Positive for congestion, postnasal drip, rhinorrhea, sinus pressure and sinus pain  Negative for ear discharge, ear pain, facial swelling, mouth sores, sneezing, sore throat and trouble swallowing  Eyes: Negative for pain, discharge, redness and itching  Respiratory: Negative for apnea, cough, chest tightness, shortness of breath, wheezing and stridor  Cardiovascular: Negative for chest pain  Gastrointestinal: Negative for abdominal distention, abdominal pain, diarrhea, nausea and vomiting  Genitourinary: Negative for difficulty urinating  Musculoskeletal: Negative for arthralgias and myalgias  Skin: Negative for pallor and rash  Allergic/Immunologic: Negative  Neurological: Negative for dizziness, light-headedness and headaches  Hematological: Negative  Psychiatric/Behavioral: Negative  All other systems reviewed and are negative  Objective   Physical Exam  Well nourished female in no apparent distress  Positive pain with palpation of the bilateral maxillary sinus region

## 2020-03-14 NOTE — CARE ANYWHERE EVISITS
Visit Summary for Ken Esquivel - Gender: Female - Date of Birth: [id number]  Date: [id number] - Duration: 3 minutes  Patient: Ken Esquivel  Provider: Ofe Millan PA-C    Patient Contact Information  Address  [address]; [facility]  [phone]    Visit Topics  sinus pressure, suffiness [Added By: Self - 2020-03-14]    Triage Questions   Have you had any international travel in the last 14 days? Answer []  Have you had any exposure to a known or expected Covid-19 patient in the last 14 days? Answer []  Do you have any immune system compromise or chronic lung disease? Answer   []  Do you have any vulnerable family members in the home (infant, pregnant, cancer, elderly)? Answer []     Conversation Transcripts  [0A][0A] [Notification] Monika Rolon,  Practice Adm, will help you prepare for your visit  She is assisting Ofe Millan PA-C, Urgent [address] Thank you for choosing [facility]  A provider will be with you   shortly  [0A][Notification] Monika Rolon has left the room  [0A][Notification] You are connected with Ofe Millan PA-C, Urgent Care Specialist [0A][Notification] Ken Esquivel is located in [place]  [0A][Notification] Ken Esquivel has   shared health history  Nicole Delgado  [0A]    Diagnosis    Procedures  Value: [id number] Code: CPT-4 UNLISTED E&M SERVICE    Medications Prescribed    No prescriptions ordered    Electronically signed by: Tg De La Cruz(NPI [id number])

## 2020-06-08 ENCOUNTER — AMB VIDEO VISIT (OUTPATIENT)
Dept: OTHER | Facility: HOSPITAL | Age: 35
End: 2020-06-08

## 2020-06-09 ENCOUNTER — TELEMEDICINE (OUTPATIENT)
Dept: FAMILY MEDICINE CLINIC | Facility: CLINIC | Age: 35
End: 2020-06-09
Payer: COMMERCIAL

## 2020-06-09 DIAGNOSIS — F41.8 SITUATIONAL ANXIETY: Primary | ICD-10-CM

## 2020-06-09 PROCEDURE — 99214 OFFICE O/P EST MOD 30 MIN: CPT | Performed by: FAMILY MEDICINE

## 2020-06-09 RX ORDER — ESCITALOPRAM OXALATE 10 MG/1
10 TABLET ORAL DAILY
Qty: 90 TABLET | Refills: 0 | Status: SHIPPED | OUTPATIENT
Start: 2020-06-09 | End: 2020-08-19 | Stop reason: SDUPTHER

## 2020-06-09 RX ORDER — HYDROXYZINE HYDROCHLORIDE 25 MG/1
TABLET, FILM COATED ORAL
Qty: 40 TABLET | Refills: 0 | Status: SHIPPED | OUTPATIENT
Start: 2020-06-09 | End: 2021-12-09

## 2020-08-19 ENCOUNTER — TELEMEDICINE (OUTPATIENT)
Dept: FAMILY MEDICINE CLINIC | Facility: CLINIC | Age: 35
End: 2020-08-19
Payer: COMMERCIAL

## 2020-08-19 DIAGNOSIS — F41.8 SITUATIONAL ANXIETY: Primary | ICD-10-CM

## 2020-08-19 PROCEDURE — 1036F TOBACCO NON-USER: CPT | Performed by: FAMILY MEDICINE

## 2020-08-19 PROCEDURE — 99213 OFFICE O/P EST LOW 20 MIN: CPT | Performed by: FAMILY MEDICINE

## 2020-08-19 RX ORDER — ESCITALOPRAM OXALATE 10 MG/1
10 TABLET ORAL DAILY
Qty: 90 TABLET | Refills: 1 | Status: SHIPPED | OUTPATIENT
Start: 2020-08-19 | End: 2021-03-26 | Stop reason: SDUPTHER

## 2020-08-19 NOTE — PROGRESS NOTES
Virtual Regular Visit      Assessment/Plan:    Problem List Items Addressed This Visit        Active Problems    Situational anxiety - Primary    Relevant Medications    escitalopram (LEXAPRO) 10 mg tablet        Stable anxiety  Cont meds  Could consider wean if future if situation improves at work     Reason for visit is   Chief Complaint   Patient presents with    Virtual Regular Visit        Encounter provider Manjula Trinidad DO    Provider located at [address]  [address] [facility] [facility]  [address]  [place] [address]      Recent Visits  No visits were found meeting these conditions  Showing recent visits within past 7 days and meeting all other requirements     Today's Visits  Date Type Provider Dept   08/19/20 [address] [facility], [address] [place] [address] today's visits and meeting all other requirements     Future Appointments  No visits were found meeting these conditions  Showing future appointments within next 150 days and meeting all other requirements        The patient was identified by name and date of birth  Lili Lorenzo was informed that this is a telemedicine visit and that the visit is being conducted through telephone  My office door was closed  No one else was in the room  She acknowledged consent and understanding of privacy and security of the video platform  The patient has agreed to participate and understands they can discontinue the visit at any time  Patient is aware this is a billable service  Subjective  Lili Lorenzo is a 37 y o  female with anxiety         HPI     On lexapro  Stressful  Work still very busy  No end in sight  Functioning ok    Less crying   Tolerating stress better  Feels more normal now    Works remotely still   says she is ok  Sleeping ok  Atarax made her drowsy one time, whole pill taken  No etoh  Some night sweats  Anxiety ok overall    Noticed more stress feelings when she missed a dose once    [facility]  Dr Bennie Crews, [facility]    History reviewed  No pertinent past medical history  Past Surgical History:   Procedure Laterality Date    NO PAST SURGERIES         Current Outpatient Medications   Medication Sig Dispense Refill    escitalopram (LEXAPRO) 10 mg tablet Take 1 tablet (10 mg total) by mouth daily 90 tablet 1    hydrOXYzine HCL (ATARAX) 25 mg tablet 1-2 po q6hrs prn for anxiety 40 tablet 0    norgestimate-ethinyl estradiol (ORTHO TRI-CYCLEN LO) 0 18/0 215/0 25 MG-25 MCG per tablet Take 1 tablet by mouth daily       No current facility-administered medications for this visit  No Known Allergies    Review of Systems   Gastrointestinal: Negative for nausea and vomiting  Video Exam    There were no vitals filed for this visit  Physical Exam     It was my intent to perform this visit via video technology but the patient was not able to do a video connection so the visit was completed via audio telephone only  I spent 16 minutes directly with the patient during this visit      VIRTUAL VISIT [address] [place] [address] acknowledges that she has consented to an online visit or consultation  She understands that the online visit is based solely on information provided by her, and that, in the absence of a face-to-face physical evaluation by the physician, the diagnosis she receives is both limited and provisional in terms of accuracy and completeness  This is not intended to replace a full medical face-to-face evaluation by the physician  Cleopatra Walker understands and accepts these terms

## 2020-08-20 ENCOUNTER — TELEPHONE (OUTPATIENT)
Dept: ADMINISTRATIVE | Facility: OTHER | Age: 35
End: 2020-08-20

## 2020-08-20 NOTE — TELEPHONE ENCOUNTER
Upon review of the In Basket request and the patient's chart, initial outreach has been made via fax, please see Contacts section for details  A second outreach attempt will be made within 5 business days      Thank you  Melinda Chew MA

## 2020-08-20 NOTE — TELEPHONE ENCOUNTER
----- Message from Manjula Trinidad DO sent at 8/19/2020 10:23 PM EDT -----  08/19/20 10:23 PM    Hello, our patient Lili Lorenzo has had Pap Smear (HPV) aka Cervical Cancer Screening completed/performed  Please assist in updating the patient chart by making an External outreach to Dr Tony Silva [facility] located in [address]  The date of service is recent      Thank you,  Manjula Trinidad DO  [place] [address]

## 2020-08-20 NOTE — LETTER
Procedure Request Form: Cervical Cancer Screening      Date Requested: 20  Patient: Robyn Phanco  Patient : 1985   Referring Provider: Lynn Ring, DO        Date of Procedure ______________________________       The above patient has informed us that they have completed their   most recent Cervical Cancer Screening at your facility  Please complete   this form and attach all corresponding procedure reports/results  Comments __________________________________________________________  ____________________________________________________________________  ____________________________________________________________________  ____________________________________________________________________    Facility Completing Procedure _________________________________________    Form Completed By (print name) _______________________________________      Signature __________________________________________________________      These reports are needed for  compliance    Please fax this completed form and a copy of the procedure report to our office located at [place] [address] as soon as possible to [phone] attention Senia Merino: Phone [phone]    We thank you for your assistance in treating our mutual patient

## 2020-08-24 NOTE — TELEPHONE ENCOUNTER
Upon review of the In Basket request we were able to locate, review, and update the patient chart as requested for Pap Smear (HPV) aka Cervical Cancer Screening  Pap resulted only patient is not young for mammo screening    Any additional questions or concerns should be emailed to the Practice Liaisons via [email or web address] email, please do not reply via In Basket      Thank you  Ramiro Rahman MA

## 2021-01-14 ENCOUNTER — TELEPHONE (OUTPATIENT)
Dept: FAMILY MEDICINE CLINIC | Facility: CLINIC | Age: 36
End: 2021-01-14

## 2021-01-14 DIAGNOSIS — Z20.822 EXPOSURE TO COVID-19 VIRUS: Primary | ICD-10-CM

## 2021-01-14 DIAGNOSIS — Z20.822 EXPOSURE TO COVID-19 VIRUS: ICD-10-CM

## 2021-01-14 PROCEDURE — U0005 INFEC AGEN DETEC AMPLI PROBE: HCPCS | Performed by: FAMILY MEDICINE

## 2021-01-14 PROCEDURE — U0003 INFECTIOUS AGENT DETECTION BY NUCLEIC ACID (DNA OR RNA); SEVERE ACUTE RESPIRATORY SYNDROME CORONAVIRUS 2 (SARS-COV-2) (CORONAVIRUS DISEASE [COVID-19]), AMPLIFIED PROBE TECHNIQUE, MAKING USE OF HIGH THROUGHPUT TECHNOLOGIES AS DESCRIBED BY CMS-2020-01-R: HCPCS | Performed by: FAMILY MEDICINE

## 2021-01-14 NOTE — TELEPHONE ENCOUNTER
Dione's  is covid positive and she would like to be tested  No symptoms   Aware of $150    Please call patient back when order ready    Thank you

## 2021-01-14 NOTE — TELEPHONE ENCOUNTER
[facility]  Cardiology Progress Note    Osmani Mzt MD   12/17/2019          Assessment and Plan:   Radha Cunningham is a 89 year old female with past medical history significant for diverticulitis, chronic back pain, and chronic atrial fibrillation, pacemaker placement, who was admitted on 12/15/2019 with respiratory distress requiring bipap. In afib with RVR on admission. Notably, had hospitalization several weeks ago also for respiratory failure, and discharged to TCU after treated with steroids and antibiotics (previously lived independently).    Atrial fibrillation.  Patient has tolerated step up of beta-blockers.  Blood pressure is no longer a problem.  I will continue to advance beta-blockers increasing to her baseline of 50 twice daily of metoprolol tartrate.  Patient is adequately anticoagulated.  We did discuss with family whether patient should continue with anticoagulation.  Also briefly talked about watchman.  She was living independently prior to her recent admissions.    New cardiomyopathy.  Unclear whether this is rate related cardiomyopathy to A. fib versus stress cardiomyopathy versus underlying coronary disease.  Echocardiogram from November was normal left ventricular function.  Current echo is ejection fraction 20 to 25%.    Volume overload.  Patient was initially thought to be volume depleted.  She was repleted and I suspect over volume loaded.  She does appear to responding to IV Lasix given this morning.  I will repeat the dose at this time.    Upper respiratory infection.  Patient still requiring intermittent BiPAP.             Interval History:   Patient has deteriorated today I suspect secondary to volume overload.  I will continue to diurese.              Medications:       - MEDICATION INSTRUCTIONS -       Warfarin Therapy Reminder         emollient   Topical BID     furosemide  40 mg Intravenous Once     HYDROcodone-acetaminophen  1 tablet Oral BID     insulin  sure "aspart  1-6 Units Subcutaneous Q4H     insulin glargine  8 Units Subcutaneous QAM AC     ipratropium - albuterol 0.5 mg/2.5 mg/3 mL  3 mL Nebulization 4x daily     metoprolol tartrate  50 mg Oral BID     predniSONE  40 mg Oral Daily     warfarin ANTICOAGULANT  2 mg Oral ONCE at 18:00                   Physical Exam:   Blood pressure (!) 130/102, pulse 93, temperature 98  F (36.7  C), resp. rate (!) 32, height 1.6 m (5' 3\"), weight 74.6 kg (164 lb 7.4 oz), SpO2 92 %.  Vitals:    12/15/19 0935 12/16/19 0400 12/17/19 0535   Weight: 72.8 kg (160 lb 7.9 oz) 72.8 kg (160 lb 7.9 oz) 74.6 kg (164 lb 7.4 oz)     Vital Signs with Ranges  Temp:  [97.4  F (36.3  C)-98.2  F (36.8  C)] 98  F (36.7  C)  Pulse:  [] 93  Heart Rate:  [] 102  Resp:  [13-39] 32  BP: ()/() 130/102  FiO2 (%):  [21 %-30 %] 30 %  SpO2:  [89 %-100 %] 92 %  I/O's Last 24 hours  I/O last 3 completed shifts:  In: 1275 [P.O.:120; I.V.:1155]  Out: 500 [Urine:500]       General: Patient in moderate respiratory distress.  Tachycardic and tachypneic.  Being placed back on a BiPAP mask.  Neck: + JVD, no carotid bruits.  Lungs: Diffuse rhonchi and scattered rales.  Cardiac: Rapid irregularly irregular..  Abdomen:  Soft, nontender.  Extremities: Trace to 1+ edema.  2+ pulses.  Skin:  Warm, dry.  Neurologic:  No focal deficits.         Data:        Recent Labs   Lab Test 12/17/19  0530 12/16/19  0540 12/15/19  0524  05/06/16  2054    140 137   < > 138   POTASSIUM 4.2 4.2 4.2   < > 4.1   CHLORIDE 104 105 104   < > 107   CO2 27 27 24   < > 27   BUN 35* 27 18   < > 29   CR 1.03 0.97 0.88   < > 1.12*   ANIONGAP 7 8 9   < > 4   SHAN 8.8 8.8 8.9   < > 9.2   * 173* 287*   < > 140*   ALBUMIN 2.9* 3.0* 3.2*  --  3.8   PROTTOTAL 6.3* 6.6* 6.8  --  7.0   GFRESTIMATED 48* 51* 57*   < > 46*   GFRESTBLACK 55* 60* 66   < > 56*   NTBNPI  --   --  2,584*  --  666    < > = values in this interval not displayed.     Recent Labs   Lab Test " 12/17/19  0530 12/16/19  0540 12/15/19  0524   HGB 10.6* 10.0* 10.5*     Recent Labs   Lab Test 11/18/19  0953   TSH 1.32     Recent Labs   Lab Test 12/15/19  0524 11/18/19  0953 05/06/16  2105 05/06/16  2054   TROPI 0.045 0.024  --  <0.015  The 99th percentile for upper reference range is 0.045 ug/L.  Troponin values in   the range of 0.045 - 0.120 ug/L may be associated with risks of adverse   clinical events.     TROPONIN  --   --  0.01  --      No lab results found.    Invalid input(s): [id number]  No lab results found.          > 30 minutes spent

## 2021-01-15 ENCOUNTER — TELEPHONE (OUTPATIENT)
Dept: FAMILY MEDICINE CLINIC | Facility: CLINIC | Age: 36
End: 2021-01-15

## 2021-01-15 LAB — SARS-COV-2 RNA SPEC QL NAA+PROBE: NOT DETECTED

## 2021-03-26 ENCOUNTER — OFFICE VISIT (OUTPATIENT)
Dept: FAMILY MEDICINE CLINIC | Facility: CLINIC | Age: 36
End: 2021-03-26
Payer: COMMERCIAL

## 2021-03-26 VITALS
OXYGEN SATURATION: 98 % | DIASTOLIC BLOOD PRESSURE: 70 MMHG | TEMPERATURE: 95.5 F | SYSTOLIC BLOOD PRESSURE: 116 MMHG | HEIGHT: 64 IN | WEIGHT: 193 LBS | BODY MASS INDEX: 32.95 KG/M2 | RESPIRATION RATE: 16 BRPM | HEART RATE: 69 BPM

## 2021-03-26 DIAGNOSIS — B36.0 TV (TINEA VERSICOLOR): ICD-10-CM

## 2021-03-26 DIAGNOSIS — F41.8 SITUATIONAL ANXIETY: Primary | ICD-10-CM

## 2021-03-26 DIAGNOSIS — Z23 IMMUNIZATION DUE: ICD-10-CM

## 2021-03-26 PROCEDURE — 3008F BODY MASS INDEX DOCD: CPT | Performed by: FAMILY MEDICINE

## 2021-03-26 PROCEDURE — 1036F TOBACCO NON-USER: CPT | Performed by: FAMILY MEDICINE

## 2021-03-26 PROCEDURE — 99213 OFFICE O/P EST LOW 20 MIN: CPT | Performed by: FAMILY MEDICINE

## 2021-03-26 PROCEDURE — 3725F SCREEN DEPRESSION PERFORMED: CPT | Performed by: FAMILY MEDICINE

## 2021-03-26 RX ORDER — SELENIUM SULFIDE 2.5 MG/100ML
LOTION TOPICAL
Qty: 120 ML | Refills: 5 | Status: SHIPPED | OUTPATIENT
Start: 2021-03-26

## 2021-03-26 RX ORDER — ESCITALOPRAM OXALATE 10 MG/1
10 TABLET ORAL DAILY
Qty: 30 TABLET | Refills: 0 | Status: SHIPPED | OUTPATIENT
Start: 2021-03-26 | End: 2021-12-09

## 2021-03-26 RX ORDER — FLUCONAZOLE 150 MG/1
150 TABLET ORAL ONCE
Qty: 1 TABLET | Refills: 0 | Status: SHIPPED | OUTPATIENT
Start: 2021-03-26 | End: 2021-03-26

## 2021-03-26 NOTE — PROGRESS NOTES
Assessment/Plan:    No problem-specific Assessment & Plan notes found for this encounter  Stop lexapro by taking 1/2 pil for 1w then stop  Anxiety much better  May use hydroxyzine if needed in future    TV  Used to see derm  Ongoing issue  Wedding in June  Worse in summer  Tolerated fluconazole in past  Has used some otc soap in past    F/u prn   Continue wt loss efforts that she is having success with thus far         Diagnoses and all orders for this visit:    Situational anxiety  -     escitalopram (LEXAPRO) 10 mg tablet; Take 1 tablet (10 mg total) by mouth daily    Immunization due  -     TDAP VACCINE GREATER THAN OR EQUAL TO 6YO IM    TV (tinea versicolor)  -     fluconazole (DIFLUCAN) 150 mg tablet; Take 1 tablet (150 mg total) by mouth once for 1 dose  -     selenium sulfide (SELSUN) 2 5 % shampoo; Lather whole body for 10 minutes daily for 7 days then 3x/week for prevention        Return if symptoms worsen or fail to improve  Subjective:      Patient ID: Tony Vickers is a 37 y o  female  Chief Complaint   Patient presents with    Follow-up     med check maf cma       HPI  Work is better  Better position  Partly working at home  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  Better balance with work  Not crying  Lost 19#  Eating better  Exercise 1x/w  Mood stable    The following portions of the patient's history were reviewed and updated as appropriate: allergies, current medications, past family history, past medical history, past social history, past surgical history and problem list     Review of Systems   Constitutional: Negative for fever  Respiratory: Negative for shortness of breath            Current Outpatient Medications   Medication Sig Dispense Refill    escitalopram (LEXAPRO) 10 mg tablet Take 1 tablet (10 mg total) by mouth daily 30 tablet 0    fluconazole (DIFLUCAN) 150 mg tablet Take 1 tablet (150 mg total) by mouth once for 1 dose 1 tablet 0    hydrOXYzine HCL (ATARAX) 25 mg tablet 1-2 po q6hrs prn for anxiety (Patient not taking: Reported on 3/26/2021) 40 tablet 0    norgestimate-ethinyl estradiol (ORTHO TRI-CYCLEN LO) 0 18/0 215/0 25 MG-25 MCG per tablet Take 1 tablet by mouth daily      selenium sulfide (SELSUN) 2 5 % shampoo Lather whole body for 10 minutes daily for 7 days then 3x/week for prevention 120 mL 5     No current facility-administered medications for this visit  Objective:    /70   Pulse 69   Temp (!) 95 5 °F (35 3 °C)   Resp 16   Ht 5' 4" (1 626 m)   Wt 87 5 kg (193 lb)   LMP 02/06/2021 (Approximate)   SpO2 98%   BMI 33 13 kg/m²        Physical Exam  Vitals signs and nursing note reviewed  Constitutional:       Appearance: She is well-developed  She is not ill-appearing  HENT:      Head: Normocephalic  Eyes:      Conjunctiva/sclera: Conjunctivae normal    Neck:      Musculoskeletal: Neck supple  Cardiovascular:      Rate and Rhythm: Normal rate  Pulmonary:      Effort: Pulmonary effort is normal  No respiratory distress  Abdominal:      Palpations: Abdomen is soft  Musculoskeletal:         General: No deformity  Skin:     General: Skin is warm and dry  Findings: Rash present  Comments: TV rash chest and back   Neurological:      Mental Status: She is alert  Psychiatric:         Behavior: Behavior normal          Thought Content:  Thought content normal                 Vinicius Keating DO

## 2021-04-02 ENCOUNTER — CLINICAL SUPPORT (OUTPATIENT)
Dept: FAMILY MEDICINE CLINIC | Facility: CLINIC | Age: 36
End: 2021-04-02
Payer: COMMERCIAL

## 2021-04-02 VITALS — TEMPERATURE: 97.5 F

## 2021-04-02 DIAGNOSIS — Z23 NEED FOR VACCINATION: Primary | ICD-10-CM

## 2021-04-02 PROCEDURE — 90715 TDAP VACCINE 7 YRS/> IM: CPT

## 2021-04-02 PROCEDURE — 90471 IMMUNIZATION ADMIN: CPT

## 2021-06-23 ENCOUNTER — AMB VIDEO VISIT (OUTPATIENT)
Dept: OTHER | Facility: HOSPITAL | Age: 36
End: 2021-06-23

## 2021-06-23 PROCEDURE — ECARE PR SL URGENT CARE VIRTUAL VISIT: Performed by: FAMILY MEDICINE

## 2021-06-23 NOTE — CARE ANYWHERE EVISITS
Visit Summary for Leni Trinidad - Gender: Female - Date of Birth: [id number]  Date: [id number] - Duration: 5 minutes  Patient: Leni Trinidad  Provider: Khushi Dixon    Patient Contact Information  Address  [address]; [place]  [phone]    Visit Topics  awful sinus pressure [Added By: Self - 2021-06-23]    Triage Questions   What is your current physical address in the event of a medical emergency? Answer []  Are you allergic to any medications? Answer []  Are you now or could you be pregnant? Answer []  Do you have any immune system compromise or chronic lung   disease? Answer []  Do you have any vulnerable family members in the home (infant, pregnant, cancer, elderly)? Answer []     Conversation Transcripts  [0A][0A] [Notification] You are connected with Khushi Dixon, Family Physician [0A][Notification] Leni Trinidad is located in [place]  [0A][Notification] Leni Trinidad has shared health history  Dara Soulier  [0A][Notification] Khushi Dixon has   added a diagnosis/procedure code  [0A][Notification] Khushi Dixon has added a prescription [0A][Notification] Tylene Halbelkis has added a prescription [0A][Notification] Tylene Halim has added a prescription [0A][Notification] Tylene Halim has   added a prescription  [0A]    Diagnosis  Acute sinusitis, unspecified    Procedures    Medications Prescribed    Benadryl  Dose : 1 capsule  Route : oral  Frequency : as instructed  Until directed to stop  Patient Instructions : at bedtime  Refills : 0  Instructions to the Pharmacist : beware drowsiness/no driving  Substitutions allowed    ibuprofen      Frequency :   Patient Instructions : 1-2 tabs po q 8 hrs prn pain  Refills : 0  Instructions to the Pharmacist : stop if any stomach upset  Substitutions allowed    Flonase Allergy Relief  Dose : 2 sprays  Route : nasal  Frequency : every day  Until directed to stop     Patient Instructions : each nostril  Refills : 0  Instructions to the Pharmacist : Substitutions allowed    amoxicillin  Dose : 1 tablet  Route : oral  Frequency : every 12 hours  Until directed to stop  Refills : 0  Instructions to the Pharmacist : Substitutions allowed      Provider Notes  [0A][0A] nj, confirmed[0A]:  1987[0A]mobile  [0A]TOPICS:awful sinus pressure[0A]began  yesterday , worsening, severe[0A]head cold, sinus pain + pressure worsening, severe nasal congestion in head, PND thick mucus,  sore throat, no cough, no sob,   no wheeze or chest discomfort  no F, No chills, sweats BA, rx;  dayquil no relief  [0A]no known covid exposure[0A]NAD, HA and sinus pressure w/ nasal congestion, worsening, severe RAD to cheeks and fh severe, sore throat, ln ttp, breathing and speaking   comfortably[0A]ALLERGIES: nkda[0A]DAILY MEDS/SUPPLEMENTS: IUD[0A]PMH: none[0A]SHX:[0A]not pregnant or bf[0A]no covid vaccine yet[0A]Diagnosis:[0A]sinusitis, acute: allergic vs viral vs bacterial, most likely[0A]Treatment:[0A]rest[0A]steam   inhalation[0A]nasal saline as directed as needed[0A]salt gargles[0A]increase fluid intake[0A]humidifier[0A]flonase, 2 jetts each nostril in the am[0A]tylenol  or ibuprofen as directed as needed for fever or body aches, stop if any stomach   upset[0A]benadryl,  at bedtime as needed for congestion - BEWARE DROWSINESS! !![0A]amoxicllin, as directed, please complete entire course[0A]Daily yogurt or probioitc for at least 3-6 months to replace the digestive bacteria lost to antibiotic use  [0A]ALL   antibioitcs can increase your risk for a yeast infection, may take any over the counter anti fungal regimen as directed as needed or may reconnect for other treatment options  [0A]Patient agrees to please follow up with your Primary Care Physician for   full evaluation, and additional treatment if not improving or worsening  [0A]Please reconnect or send a secure message anytime, as needed   [0A]Please send me a secure message (click the envelope on the right side of the screen) letting me know how you   are doing in 3-4 days, or after the treatment has been completed  [0A] [0A]Thank you for using [facility]  It was a pleasure speaking with you  I look forward to seeing you again for any future medical needs  [0A] [0A]Please print a copy of this note   and send it to your regular doctor, or take it to your next visit so it may be included in your medical record  [0A] [0A]If you need to speak to customer support, please call the following number: [0A][facility]: [phone]  [0A] [0A]And for   pharmacy related issues, the number is [phone]  [0A]Thanks for consulting with me, I hope you will be feeling better soon! [0A]    Electronically signed by:  Yaa Cadena(NPI [id number])

## 2021-12-09 ENCOUNTER — OFFICE VISIT (OUTPATIENT)
Dept: FAMILY MEDICINE CLINIC | Facility: CLINIC | Age: 36
End: 2021-12-09
Payer: COMMERCIAL

## 2021-12-09 VITALS
HEIGHT: 64 IN | TEMPERATURE: 97.9 F | SYSTOLIC BLOOD PRESSURE: 110 MMHG | WEIGHT: 190 LBS | BODY MASS INDEX: 32.44 KG/M2 | DIASTOLIC BLOOD PRESSURE: 72 MMHG | RESPIRATION RATE: 16 BRPM | HEART RATE: 72 BPM | OXYGEN SATURATION: 98 %

## 2021-12-09 DIAGNOSIS — R10.13 EPIGASTRIC PAIN: ICD-10-CM

## 2021-12-09 DIAGNOSIS — R19.7 DIARRHEA OF PRESUMED INFECTIOUS ORIGIN: Primary | ICD-10-CM

## 2021-12-09 PROCEDURE — 1036F TOBACCO NON-USER: CPT | Performed by: FAMILY MEDICINE

## 2021-12-09 PROCEDURE — 3008F BODY MASS INDEX DOCD: CPT | Performed by: FAMILY MEDICINE

## 2021-12-09 PROCEDURE — 99214 OFFICE O/P EST MOD 30 MIN: CPT | Performed by: FAMILY MEDICINE

## 2021-12-09 PROCEDURE — 3725F SCREEN DEPRESSION PERFORMED: CPT | Performed by: FAMILY MEDICINE

## 2021-12-09 RX ORDER — CIPROFLOXACIN 500 MG/1
500 TABLET, FILM COATED ORAL 2 TIMES DAILY
Qty: 10 TABLET | Refills: 0 | Status: SHIPPED | OUTPATIENT
Start: 2021-12-09 | End: 2021-12-14

## 2021-12-10 ENCOUNTER — HOSPITAL ENCOUNTER (OUTPATIENT)
Dept: ULTRASOUND IMAGING | Facility: HOSPITAL | Age: 36
Discharge: HOME | End: 2021-12-10
Payer: COMMERCIAL

## 2021-12-10 DIAGNOSIS — R10.13 EPIGASTRIC PAIN: ICD-10-CM

## 2021-12-10 DIAGNOSIS — R19.7 DIARRHEA OF PRESUMED INFECTIOUS ORIGIN: ICD-10-CM

## 2021-12-10 LAB
ALBUMIN SERPL-MCNC: 4.5 G/DL (ref 3.8–4.8)
ALBUMIN/GLOB SERPL: 1.8 {RATIO} (ref 1.2–2.2)
ALP SERPL-CCNC: 75 IU/L (ref 44–121)
ALT SERPL-CCNC: 14 IU/L (ref 0–32)
AMYLASE SERPL-CCNC: 34 U/L (ref 31–110)
AST SERPL-CCNC: 13 IU/L (ref 0–40)
BASOPHILS # BLD AUTO: 0 X10E3/UL (ref 0–0.2)
BASOPHILS NFR BLD AUTO: 0 %
BILIRUB SERPL-MCNC: <0.2 MG/DL (ref 0–1.2)
BUN SERPL-MCNC: 10 MG/DL (ref 6–20)
BUN/CREAT SERPL: 16 (ref 9–23)
CALCIUM SERPL-MCNC: 8.9 MG/DL (ref 8.7–10.2)
CHLORIDE SERPL-SCNC: 107 MMOL/L (ref 96–106)
CO2 SERPL-SCNC: 25 MMOL/L (ref 20–29)
CREAT SERPL-MCNC: 0.61 MG/DL (ref 0.57–1)
EOSINOPHIL # BLD AUTO: 0.1 X10E3/UL (ref 0–0.4)
EOSINOPHIL NFR BLD AUTO: 2 %
ERYTHROCYTE [DISTWIDTH] IN BLOOD BY AUTOMATED COUNT: 11.8 % (ref 11.7–15.4)
GLOBULIN SER-MCNC: 2.5 G/DL (ref 1.5–4.5)
GLUCOSE SERPL-MCNC: 80 MG/DL (ref 65–99)
HCT VFR BLD AUTO: 42.3 % (ref 34–46.6)
HGB BLD-MCNC: 14.2 G/DL (ref 11.1–15.9)
IMM GRANULOCYTES # BLD: 0 X10E3/UL (ref 0–0.1)
IMM GRANULOCYTES NFR BLD: 0 %
LIPASE SERPL-CCNC: 30 U/L (ref 14–72)
LYMPHOCYTES # BLD AUTO: 2.7 X10E3/UL (ref 0.7–3.1)
LYMPHOCYTES NFR BLD AUTO: 36 %
MCH RBC QN AUTO: 29.3 PG (ref 26.6–33)
MCHC RBC AUTO-ENTMCNC: 33.6 G/DL (ref 31.5–35.7)
MCV RBC AUTO: 87 FL (ref 79–97)
MONOCYTES # BLD AUTO: 0.4 X10E3/UL (ref 0.1–0.9)
MONOCYTES NFR BLD AUTO: 6 %
NEUTROPHILS # BLD AUTO: 4.1 X10E3/UL (ref 1.4–7)
NEUTROPHILS NFR BLD AUTO: 56 %
PLATELET # BLD AUTO: 339 X10E3/UL (ref 150–450)
POTASSIUM SERPL-SCNC: 3.8 MMOL/L (ref 3.5–5.2)
PROT SERPL-MCNC: 7 G/DL (ref 6–8.5)
RBC # BLD AUTO: 4.84 X10E6/UL (ref 3.77–5.28)
SL AMB EGFR AFRICAN AMERICAN: 137 ML/MIN/1.73
SL AMB EGFR NON AFRICAN AMERICAN: 119 ML/MIN/1.73
SODIUM SERPL-SCNC: 144 MMOL/L (ref 134–144)
WBC # BLD AUTO: 7.4 X10E3/UL (ref 3.4–10.8)

## 2021-12-10 PROCEDURE — 76705 ECHO EXAM OF ABDOMEN: CPT

## 2021-12-14 LAB

## 2021-12-15 PROBLEM — K80.20 CALCULUS OF GALLBLADDER WITHOUT CHOLECYSTITIS WITHOUT OBSTRUCTION: Status: ACTIVE | Noted: 2021-12-15

## 2022-10-12 PROBLEM — R19.7 DIARRHEA OF PRESUMED INFECTIOUS ORIGIN: Status: RESOLVED | Noted: 2021-12-09 | Resolved: 2022-10-12

## 2022-11-22 ENCOUNTER — TELEPHONE (OUTPATIENT)
Dept: FAMILY MEDICINE CLINIC | Facility: CLINIC | Age: 37
End: 2022-11-22

## 2022-11-22 ENCOUNTER — OFFICE VISIT (OUTPATIENT)
Dept: FAMILY MEDICINE CLINIC | Facility: CLINIC | Age: 37
End: 2022-11-22

## 2022-11-22 VITALS
SYSTOLIC BLOOD PRESSURE: 114 MMHG | HEART RATE: 76 BPM | BODY MASS INDEX: 34.86 KG/M2 | TEMPERATURE: 98 F | WEIGHT: 204.2 LBS | DIASTOLIC BLOOD PRESSURE: 68 MMHG | RESPIRATION RATE: 17 BRPM | HEIGHT: 64 IN | OXYGEN SATURATION: 97 %

## 2022-11-22 DIAGNOSIS — J11.1 INFLUENZA: Primary | ICD-10-CM

## 2022-11-22 DIAGNOSIS — J06.9 ACUTE URI: ICD-10-CM

## 2022-11-22 RX ORDER — AZITHROMYCIN 250 MG/1
TABLET, FILM COATED ORAL
Qty: 6 TABLET | Refills: 0 | Status: SHIPPED | OUTPATIENT
Start: 2022-11-22 | End: 2022-11-27

## 2022-11-22 RX ORDER — OSELTAMIVIR PHOSPHATE 75 MG/1
75 CAPSULE ORAL 2 TIMES DAILY
Qty: 10 CAPSULE | Refills: 0 | Status: SHIPPED | OUTPATIENT
Start: 2022-11-22 | End: 2022-11-27

## 2022-11-22 NOTE — PROGRESS NOTES
Assessment/Plan:    No problem-specific Assessment & Plan notes found for this encounter  I told her that if her son tests positive or flu then she should let me know so as to call in tamiflu for her   reports flu pos so tamiflu sent to her pharmacy    [facility] if no better with mucinex DM and sudafed in 2-3d    paxlovid risks/benefits discussed, she is low risk though     Diagnoses and all orders for this visit:    Influenza  -     oseltamivir (TAMIFLU) 75 mg capsule; Take 1 capsule (75 mg total) by mouth 2 (two) times a day for 5 days    Acute URI  -     azithromycin (ZITHROMAX) 250 mg tablet; Take 500mg on day 1, 250mg on days 2-5    BMI 35 0-35 9,adult        Return if symptoms worsen or fail to improve  Subjective:      Patient ID: Nestor Ramos is a 30 y o  female  Chief Complaint   Patient presents with   • Cough     Onset: 1 day  klcma   • Fever   • Headache   • Generalized Body Aches   • chest congestion       HPI  2nd day  Cough  Mucus green  Fever and chills and aches  Temp ? Feverish  Some sore throat  Headache/pressure  Son is sick, age 10 yo, tests pending  No ear pain  Sinus congestion  Took mucinex DM and tylenol    The following portions of the patient's history were reviewed and updated as appropriate: allergies, current medications, past family history, past medical history, past social history, past surgical history and problem list     Review of Systems   Respiratory: Negative for shortness of breath and wheezing            Current Outpatient Medications   Medication Sig Dispense Refill   • azithromycin (ZITHROMAX) 250 mg tablet Take 500mg on day 1, 250mg on days 2-5 6 tablet 0   • oseltamivir (TAMIFLU) 75 mg capsule Take 1 capsule (75 mg total) by mouth 2 (two) times a day for 5 days 10 capsule 0   • selenium sulfide (SELSUN) 2 5 % shampoo Lather whole body for 10 minutes daily for 7 days then 3x/week for prevention 120 mL 5     No current facility-administered medications for this visit  Objective:    /68   Pulse 76   Temp 98 °F (36 7 °C)   Resp 17   Ht 5' 4" (1 626 m)   Wt 92 6 kg (204 lb 3 2 oz)   SpO2 97%   BMI 35 05 kg/m²        Physical Exam  Vitals and nursing note reviewed  Constitutional:       General: She is not in acute distress  Appearance: She is well-developed  She is not ill-appearing  HENT:      Head: Normocephalic  Right Ear: Tympanic membrane and external ear normal       Left Ear: Tympanic membrane and external ear normal       Nose: Congestion present  Mouth/Throat:      Pharynx: No oropharyngeal exudate  Eyes:      General: No scleral icterus  Conjunctiva/sclera: Conjunctivae normal    Cardiovascular:      Rate and Rhythm: Normal rate and regular rhythm  Pulses: Intact distal pulses  Heart sounds: No murmur heard  Pulmonary:      Effort: Pulmonary effort is normal  No respiratory distress  Breath sounds: No wheezing  Abdominal:      Palpations: Abdomen is soft  Musculoskeletal:         General: No deformity or edema  Cervical back: Neck supple  Skin:     General: Skin is warm and dry  Coloration: Skin is not pale  Neurological:      Mental Status: She is alert  Motor: No weakness  Gait: Gait normal    Psychiatric:         Mood and Affect: Mood normal          Behavior: Behavior normal          Thought Content:  Thought content normal                 Farnaz Prather DO

## 2022-11-22 NOTE — TELEPHONE ENCOUNTER
Pt son did test positive for the flu  Pt stated Dr Ameena Laughlin told her to call with her sons results so he could call in a script for her

## 2023-01-21 PROBLEM — J11.1 INFLUENZA: Status: RESOLVED | Noted: 2022-11-22 | Resolved: 2023-01-21

## 2023-01-21 PROBLEM — J06.9 ACUTE URI: Status: RESOLVED | Noted: 2022-11-22 | Resolved: 2023-01-21

## 2023-01-25 ENCOUNTER — AMB VIDEO VISIT (OUTPATIENT)
Dept: OTHER | Facility: HOSPITAL | Age: 38
End: 2023-01-25

## 2023-01-25 ENCOUNTER — TELEPHONE (OUTPATIENT)
Dept: OTHER | Facility: HOSPITAL | Age: 38
End: 2023-01-25

## 2023-01-25 NOTE — TELEPHONE ENCOUNTER
Pt called because script from tele visit not sent  She did visit with [facility] provider not [facility] provider  I am not allowed to send script to [place]  Left message for patient to call [facility] for assistant and left number

## 2023-01-25 NOTE — CARE ANYWHERE EVISITS
Visit Summary for Parker Bruce - Gender: Female - Date of Birth: [id number]  Date: [id number] - Duration: 2 minutes  Patient: Parker Bruce  Provider: Nguyen Barry    Patient Contact Information  Address  [address]; [place]  [phone]    Visit Topics  swollen white tonsils, low fever, pain swallowing [Added By: Self - 2023-01-25]    Triage Questions   What is your current physical address in the event of a medical emergency? Answer []  Are you allergic to any medications? Answer []  Are you now or could you be pregnant? Answer []  Do you have any immune system compromise or chronic lung   disease? Answer []  Do you have any vulnerable family members in the home (infant, pregnant, cancer, elderly)? Answer []     Conversation Transcripts  [0A][0A] [Notification] You are connected with Family [facility] Physician [0A][Notification] Parker Bruce is located in [place]  [0A][Notification] Parker Bruce has shared health history  Lorena Ballard  [0A]    Diagnosis  Pain in throat    Procedures  Value: [id number] Code: CPT-4 UNLISTED E&M SERVICE    Medications Prescribed    amoxicillin-pot clavulanate      Frequency :   Patient Instructions : 1 tab twice daily for 10 days   Refills : 0  Instructions to the Pharmacist : nkda  Substitutions allowed      Provider Notes  [0A][0A] HPI: patient c/o sore throat for 24 hours with  objective temp 100F Tmax  No coughing or sinus pressure or purulent nasal drainage  Noted erythema in back of throat per pt  [0A]No cp or sob or wheezing  No sign   n/v/d  no abd  pain  negative covid 19 test and fully utd on covid 19 and influenza vaccination  [0A]Has no vertigo with no ear pain  no sign  body aches or joint pain  no issues with tolerating clears and solids with no dysphagia  able to stand and walk with no issues  no severe HA or neck pain  no improvement with otc  No recent overseas travels and no prior abx  [0A]Medication List:[0A]no rx meds[0A]Past Medical History:[0A]nkda  No hx of HTN, DM, asthma, smoking or irregular heartbeat or stomach ulcers or gerds  [0A]normal physical exam and routine lab work within last 1 year and utd immunizations  no chronic or frequent URI or LRI or sinus like issues  [0A]LMP 2 days ago[0A]Webcam Exam:  no acute distress and no labored breathing or tachynea  normal prompt   response to verbal and physical cues  normal gross motor function head neck face UE   no facial droop or swelling  no gross lymphadenopathy  no pink conjunctiva  no  sinus pressure maxillary and frontal sinus when forward head tilt and when   palpitating area  marked erythema exudation noted in oropharyngeal area with uvula not deviated  Noted swelling tenderness along SCM area of neck bilaterally per pt self exam   no auditory wheezing or coughing[0A]a/p  sore throat for 24 hours with high   centor score and negative covid 19 test and fully utd on covid 19 vaccination and no immunocompromised hx[0A]will treat for possible bacterial cause[0A]augmentin 875 1 tab  twice daily for 10 days and take probiotics over the counter for 10 days   [0A]regular tylenol otc for fever or throat pain[0A]avoid any other otc meds[0A]if no improvement after 3 days please see your PCP or go to walk in clinic[0A]ER precautions fever 104F or greater, shortness of breath or wheezing, sign  worsening vomiting   and/or diarrhea, unable to stand or walk, severe headache or neck pain, worsening vertigo, unable to tolerate clears  [0A]pt understood and agree with plan of care and precautions[0A]please follow up by sending secure message to me in 2 days if any issues   or questions after starting medication[0A]If you have questions or problems with your pharmacy prescription please call [phone] for prescription assistance[0A]Please print a copy of this note and send it to your regular doctor, or take it to your   next visit so it may be included in your medical record[0A][0A]    Electronically signed by: Moncho Ray(NPI [id number])

## 2023-03-09 ENCOUNTER — AMB VIDEO VISIT (OUTPATIENT)
Dept: OTHER | Facility: HOSPITAL | Age: 38
End: 2023-03-09

## 2023-03-09 NOTE — CARE ANYWHERE EVISITS
Visit Summary for Wanda Wooten - Gender: Female - Date of Birth: [id number]  Date: [id number] - Duration: 8 minutes  Patient: Wanda Wooten  Provider: Lissette Umaña    Patient Contact Information  Address  [address]; [place]  [phone]    Visit Topics  sinus pain [Added By: Self - 2023-03-09]    Triage Questions   What is your current physical address in the event of a medical emergency? Answer []  Are you allergic to any medications? Answer []  Are you now or could you be pregnant? Answer []  Do you have any immune system compromise or chronic lung   disease? Answer []  Do you have any vulnerable family members in the home (infant, pregnant, cancer, elderly)? Answer []     Conversation Transcripts  [0A][0A] [Notification] You are connected with [facility] [0A][Notification] Wanda Wooten is located in [place]  [0A][Notification] Wanda Wooten has shared health history  [facility]  [0A]    Diagnosis  Acute upper respiratory infection, unspecified    Procedures  Value: [id number] Code: CPT-4 UNLISTED E&M SERVICE    Medications Prescribed    Dymista  Dose : 1 spray  Route : nasal  Frequency : 2 times a day  As needed  Until directed to stop  Patient Instructions : 1 spray each nostril twice daily prn   Refills : 0  Instructions to the Pharmacist : May dispense generic same directions   Directions to patient [place] towards outside nose and not ethel part to lessen risk nosebleeds  Substitutions allowed      Provider Notes  [0A][0A] Mode of Communication: [place]  Video[0A]History:  This  patient has[0A]had severe sinus pain sinus pressure since yesterday No fever or chills No cough  No recent URI in last 2 wks Humidifier has one in basement  but hasnt used it in awhile per pt    Supposed to go away this weekend and will be driving and is leaving late Friday in 2 days and pt calling today to get an antibiotic if needed before she goes away for weekend per pt [0A]No  ear pains  Pt   used OTC decongestant Mucinex DM today with no   relief [0A]No nausea vomiting myalgias diarrhea chest pains or shortness of[0A]breath No known[0A]sick contacts no recent travel  no trauma  Pt did a rapid home Covid 19  test today that was negative per pt [0A][0A] [0A][0A]Past Medical History:   None[0A][0A]Medications:  Mucinex DM[0A][0A]Allergies:  NKDANot pregnant or breastfeeding [0A][0A] [0A][0A]Exam:[0A][0A]Vitals signs Reported afebrile RR 16[0A][0A]Weight:  195 lbs  height 5'4" BMI 33 5[0A]General[0A]Alert appears well in NAD[0A]HEENT     Throat clear [0A]Sinuses tender maxillary sinuses b/l per pt guided self exam [0A]Neck[0A]Supple nontender no edema per pt guided self exam[0A]Respiratory Unlabored Speaking clearly and concisely in complete[0A]longer sentences without difficulty[0A][0A]   [0A][0A]Assessment: Acute URI[0A][0A]Diagnosis code: J06 9[0A][0A]Plan:[0A]MedsDymista 137 mcg-50 mcg/spray nasal sprayDirections: 1 spray 2 times a day until directed to stop  Take only as needed  Additional instructions: 1 spray each nostril twice   daily prn  Quantity to dispense: 23 Gram  Refills: 0  Note to Pharmacist: May dispense generic same directions Directions to patient [place] towards outside nose and not ethel part to lessen risk nosebleeds  Date Written: 03/08/2023  Others: Substitutions   allowed[0A]This medicine  may work somewhat in 30 minutes but it may take several days of daily[0A]use before full effect of this nasal spray is seen[0A]OTC as described below [0A][0A]Symptom relief-[0A]1          Home[0A]Care : Clean and use a Humidifier    for bedroom or   Steam[0A]mist inhalation 10 minutes twice daily prn taking care not to burn[0A]yourself Close windows Increase fluids Rest Salt water nose spray 3x[0A]days  prn  (premixed Ocean mist ocean spray) Start  [0A]OTC plain Mucinex 600 mg 1-2   tabs q 12hrs prn congestion/cough Stop[0A]OTC  Mucinex DM  as its not helping and  it may dry out your nose[0A]and throat [0A] [0A]2  Tylenol 650 mg[0A]q 6 hr prn or Ibuprofen 400 mg q 6 hr prn with food prn pain or fevers[0A]3  Sleeping   with[0A]head/chest elevated can help with sinus drainage[0A] Take a  repeat rapid Covid 19 test[0A]in next 2 days [0A]Positive Test[0A]  If[0A]you have a positive test result you may stop self-isolating when: You have[0A]had no fever for at least __1_   day without taking fever reducing meds[0A]AND Other symptoms have improved (such as  sinus pain  ) AND At[0A]least __5_ days have passed since your symptoms first appeared  If you are[0A]feeling better  5 days  after your symptoms began and have had   no[0A]fevers for at least 24 hrs without taking fever reducing meds then you may[0A]consider returning to in person work   as per [employer]  [employer] [facility] as long as you[0A]continue to wear a well fitting mask at all times when around others for at[0A]least another 5 days   after that and continue to wear a well fitted mask after[0A]that as per your symptoms and as per local health authorities and CDC [0A]   [0A]  Negative test- This confirms that COVID-19 is not[0A]likely the cause of your symptoms  You probably are   infected with another[0A]common respiratory  virus  These tests are not 100% perfect, though, so[0A]there is still a small chance that Covid-19 is the cause of your[0A]symptoms   If you have a negative test result you may stop self-isolating[0A]when- You   have had no fever for at least __1_ day without taking fever[0A]reducing meds  AND Other symptoms have improved (such as sinus pain )  as long as you wear a well fitting mask at all times when around[0A]others [0A] [0A]   [0A]        If you are told to   self-isolate, please do[0A]the following-[0A][0A]            i     Stay[0A]at home except to receive medical care[0A][0A]            ii     Separate[0A]yourself from other people and animals in the home and if possible, use[0A]separate bathrooms[0A][0A] iii     Jonathan Schumacher before visiting any health facility[0A][0A]            iv     Harry Desai well fitted facemask if around others[0A][0A]            v  Cover[0A]your coughs and sneezes[0A][0A]            vi     Clean[0A]your hands   often[0A][0A]            vii  Clean[0A]â€œhigh touchâ€ surfaces every day[0A] Referral[0A]or follow up: In person as needed for worsening or if no improvement[0A]in 2 days sooner prn         Monitor[0A]for any new symptoms and for worsening symptoms   especially symptoms occurring[0A]within next 2 days and seek medical attention immediately at ER if your[0A]illness worsens (for example symptoms including but not limited to :chest[0A]pains ,severe headaches  or dizziness, new severe persistent or   worsening[0A]sinus or ear pain, any new ear discharge or blood from ears, ,sudden change in[0A]hearing ,difficulty arousing patient  or significant change in behavior ,[0A]urticaria sudden numbness or weakness ,loss consciousness ,low blood   pressure[0A],fast heart rate, intractable fevers nausea vomiting diarrhea or abdominal[0A]cramps , severe persistent changing or worsening wheezing,difficulty[0A]breathing,dark colored urine)  Before seeking care, call ahead to the[0A]facility  Put on a   well fitted facemask  before entering the[0A]facility  [0A]Medical decision Making history symptoms and exam consistent with viral infection [0A][0A] History symptoms and exam alone cant be used to distinguish common cold ,allergies ,viral[0A]sinus   infection from Covid 19 vs other viruses but that testing[0A]by rapid Covid 19 antigen test or in person doctors testing with a Covid 19 PCR and/or viral panel and/or other testing is needed in[0A]order to rule out Covid 19 vs other viruses or   illnesses[0A]If you take a rapid Covid 19 antigen test too soon after[0A]start of your symptoms you can receive a false negative Covid 19 test and a[0A]repeat Covid 19 rapid antigen test is recommended to be done at least 50 hrs[0A]after your first   negative rapid Covid19 antigen test was done [0A][0A]Oral antibiotics are[0A]not indicated for viral  infection but are only indicated for bacterial [0A]infection because of risk of antibiotic resistance and risk of side effects[0A]from oral antibiotics   that are not necessary or effective [0A] Instructions as above and below [0A][0A][0A]Follow up:[0A][0A]1  If you received a prescription at this visit and have any questions or[0A]problems with the prescription, call [phone] for   assistance  [0A][0A]2  Please  see an in-person provider should your symptoms worsen or[0A]persist longer than 2 days ER sooner prn  [0A][0A]3  Please print a copy of this note and send it to your regular doctor, or take[0A]it to your next visit so it may   be included in your medical record  [0A][0A][0A][0A]Patient voiced understanding and agrees to plan [0A][0A][0A][0A]Please be sure to see your PCP on an annual basis  [0A] [0A]  [0A] [0A]    Electronically signed by:  Israel Canchola(NPI [id number])

## 2023-07-16 ENCOUNTER — AMB VIDEO VISIT (OUTPATIENT)
Dept: OTHER | Facility: HOSPITAL | Age: 38
End: 2023-07-16

## 2023-07-16 DIAGNOSIS — B36.0 TV (TINEA VERSICOLOR): ICD-10-CM

## 2023-07-16 DIAGNOSIS — J02.9 EXUDATIVE PHARYNGITIS: Primary | ICD-10-CM

## 2023-07-16 PROBLEM — R10.13 EPIGASTRIC PAIN: Status: RESOLVED | Noted: 2021-12-09 | Resolved: 2023-07-16

## 2023-07-16 PROBLEM — F41.8 SITUATIONAL ANXIETY: Status: RESOLVED | Noted: 2020-06-09 | Resolved: 2023-07-16

## 2023-07-16 PROBLEM — Z23 IMMUNIZATION DUE: Status: RESOLVED | Noted: 2021-03-26 | Resolved: 2023-07-16

## 2023-07-16 PROCEDURE — ECARE PR SL URGENT CARE VIRTUAL VISIT: Performed by: PHYSICIAN ASSISTANT

## 2023-07-16 RX ORDER — AMOXICILLIN 500 MG/1
500 TABLET, FILM COATED ORAL 2 TIMES DAILY
Qty: 20 TABLET | Refills: 0 | Status: SHIPPED | OUTPATIENT
Start: 2023-07-16 | End: 2023-07-26

## 2023-07-16 RX ORDER — SELENIUM SULFIDE 2.5 MG/100ML
LOTION TOPICAL
Qty: 120 ML | Refills: 5 | Status: SHIPPED | OUTPATIENT
Start: 2023-07-16

## 2023-07-16 NOTE — PROGRESS NOTES
Video Visit - Tony Villanueva 41 y.o. female MRN: [id number]    REQUIRED DOCUMENTATION:         1. This service was provided via [employer]. 2. Provider located at [facility]  [address] [address] [phone]. 3. [employer] provider: Natty Nagel PA-C.  4. Identify all parties in room with patient during [facility] visit:  No one else  5. After connecting through televideo, patient was identified by name and date of birth. Patient was then informed that this was a Telemedicine visit and that the exam was being conducted confidentially over secure lines. My office door was closed. No one else was in the room. Patient acknowledged consent and understanding of privacy and security of the Telemedicine visit. I informed the patient that I have reviewed their record in Epic and presented the opportunity for them to ask any questions regarding the visit today. The patient agreed to participate. VITALS: Heart Rate: 72 BPM, Respiratory Rate: 12 RPM, Temperature 98.4° F, Blood Pressure Unavailable mmHg, Pulse Ox Unavailable % on RA    HPI  Pt reports sore throat worsening over the past few days. Started 2 days ago. Today noticed white spots. Tried numbing cough drops with some relief. Painful swallowing. Can eat and breath normal. No fevers. No concern for STI. + Exposure in the past week to people with strep. Physical Exam  Constitutional:       General: She is not in acute distress. Appearance: Normal appearance. She is not toxic-appearing. HENT:      Head: Normocephalic and atraumatic. Nose: No rhinorrhea. Mouth/Throat:      Mouth: Mucous membranes are moist.      Pharynx: Uvula midline. Posterior oropharyngeal erythema (mild) present. No uvula swelling. Tonsils: Tonsillar exudate present. No tonsillar abscesses. 2+ on the right. 2+ on the left. Eyes:      Conjunctiva/sclera: Conjunctivae normal.   Cardiovascular:      Rate and Rhythm: Normal rate. Pulmonary:      Effort: Pulmonary effort is normal. No respiratory distress. Breath sounds: No wheezing (no gross audible wheeze through computer). Musculoskeletal:      Cervical back: Normal range of motion. No tenderness. Lymphadenopathy:      Cervical: No cervical adenopathy. Skin:     Findings: No rash (on face or neck). Neurological:      Mental Status: She is alert. Cranial Nerves: No dysarthria or facial asymmetry. Psychiatric:         Mood and Affect: Mood normal.         Behavior: Behavior normal.         Diagnoses and all orders for this visit:    Exudative pharyngitis  -     amoxicillin (AMOXIL) 500 MG tablet; Take 1 tablet (500 mg total) by mouth 2 (two) times a day for 10 days    TV (tinea versicolor)  -     selenium sulfide (SELSUN) 2.5 % shampoo; Lather whole body for 10 minutes daily for 7 days then 3x/week for prevention      Patient Instructions   Schedule a follow-up appointment with your primary care physician for recheck in 2-3 days. If you cannot see your PCP, you can schedule a follow up appointment at a [facility]. Go to the emergency department if you develop any new or worsening symptoms including trouble breathing/swallowing, or anything else that is concerning. Excuses can be found in "Letters" section of [employer]. Can print if opened from a [employer] [facility] phone number is [phone] if you need assistance or have further questions    1 21 ) [facility] ([phone])  Schedule or Reschedule Outpatient Testing - Option 2  Billing - Option 3  General Info - Option 4  MyChart Help - Option 5  Comprehensive Spine Program - Option 6   COVID - Option 7    Take ibuprofen 600 mg every 6 hours  Alternate with tylenol 500-650 mg every 6 hours for more constant pain relief  Ex.  Ibuprofen 9 am, tylenol 12 pm, ibuprofen 3 pm, tylenol 6 pm, etc.    Warm salt water gargles, warm tea with honey as needed    Chloraseptic spray or throat lozenges with benzocaine (cepacol max strength). Go to the emergency department if you have worsening swelling, difficulty swallowing due to swelling, or difficulty breathing. Please schedule follow-up appointment with your primary care physician within the next 1-2 business days for recheck.

## 2023-07-16 NOTE — CARE ANYWHERE EVISITS
Visit Summary for Tatum Strickland - Gender: Female - Date of Birth: [id number]  Date: [id number] - Duration: 11 minutes  Patient: Tatum Strickland  Provider: Aurora Reyes PA-C    Patient Contact Information  Address  [address]  [phone]    Visit Topics  Headache [Added By: Self - 2023-07-16]  very soar throat with white spots [Added By: Self - 2023-07-16]    Triage Questions   What is your current physical address in the event of a medical emergency? Answer []  Are you allergic to any medications? Answer []  Are you now or could you be pregnant? Answer []  Do you have any immune system compromise or chronic lung   disease? Answer []  Do you have any vulnerable family members in the home (infant, pregnant, cancer, elderly)? Answer []     Conversation Transcripts  [0A][0A] [Notification] You are connected with Aurora Reyes PA-C, Urgent Care Specialist.[0A][Notification] Tatum Strickland is located in [facility]. [0A][Notification] Tatum Strickland has shared health history. Amanda Batista .[0A]    Diagnosis  Acute tonsillitis, unspecified    Procedures  Value: [id number] Code: CPT-4 UNLISTED E&M SERVICE    Medications Prescribed    No prescriptions ordered    Electronically signed by: Dari Tuttle(NPI [id number])

## 2023-07-16 NOTE — PATIENT INSTRUCTIONS
Schedule a follow-up appointment with your primary care physician for recheck in 2-3 days. If you cannot see your PCP, you can schedule a follow up appointment at a [facility]. Go to the emergency department if you develop any new or worsening symptoms including trouble breathing/swallowing, or anything else that is concerning. Excuses can be found in "Letters" section of [employer]. Can print if opened from a [address] phone number is [phone] if you need assistance or have further questions    1 21 [phone]) FRANKO ([phone])  Schedule or Reschedule Outpatient Testing - Option 2  Billing - Option 3  General Info - Option 4  Practice Management e-Toolshart Help - Option 5  Comprehensive Spine Program - Option 6   COVID - Option 7    Take ibuprofen 600 mg every 6 hours  Alternate with tylenol 500-650 mg every 6 hours for more constant pain relief  Ex. Ibuprofen 9 am, tylenol 12 pm, ibuprofen 3 pm, tylenol 6 pm, etc.    Warm salt water gargles, warm tea with honey as needed    Chloraseptic spray or throat lozenges with benzocaine (cepacol max strength). Go to the emergency department if you have worsening swelling, difficulty swallowing due to swelling, or difficulty breathing. Please schedule follow-up appointment with your primary care physician within the next 1-2 business days for recheck.

## 2023-12-07 ENCOUNTER — OFFICE VISIT (OUTPATIENT)
Dept: FAMILY MEDICINE CLINIC | Facility: CLINIC | Age: 38
End: 2023-12-07
Payer: COMMERCIAL

## 2023-12-07 VITALS
SYSTOLIC BLOOD PRESSURE: 124 MMHG | RESPIRATION RATE: 17 BRPM | WEIGHT: 194.3 LBS | BODY MASS INDEX: 33.17 KG/M2 | TEMPERATURE: 97.5 F | DIASTOLIC BLOOD PRESSURE: 84 MMHG | HEART RATE: 69 BPM | HEIGHT: 64 IN

## 2023-12-07 DIAGNOSIS — B34.9 VIRAL ILLNESS: Primary | ICD-10-CM

## 2023-12-07 LAB
S PYO AG THROAT QL: NEGATIVE
SARS-COV-2 AG UPPER RESP QL IA: NEGATIVE
VALID CONTROL: NORMAL

## 2023-12-07 PROCEDURE — 87811 SARS-COV-2 COVID19 W/OPTIC: CPT | Performed by: NURSE PRACTITIONER

## 2023-12-07 PROCEDURE — 87880 STREP A ASSAY W/OPTIC: CPT | Performed by: NURSE PRACTITIONER

## 2023-12-07 PROCEDURE — 99213 OFFICE O/P EST LOW 20 MIN: CPT | Performed by: NURSE PRACTITIONER

## 2023-12-07 RX ORDER — METHYLPREDNISOLONE 4 MG/1
TABLET ORAL
Qty: 21 TABLET | Refills: 0 | Status: SHIPPED | OUTPATIENT
Start: 2023-12-07

## 2023-12-07 NOTE — PROGRESS NOTES
Assessment/Plan:  Supportive care for viral illness discussed and advised. will follow up for any worsening and no improvement    1. Viral illness  -     methylPREDNISolone 4 MG tablet therapy pack; Use as directed on package  -     POCT Rapid Covid Ag  -     POCT rapid strepA          Patient Instructions: Take prednisone with food in morning and do not take any NSAID's while taking prednisone. Supportive care discussed and advised. Advised to RTO for any worsening and no improvement. Follow up for no improvement and worsening of conditions. Patient advised and educated when to see immediate medical care. Return if symptoms worsen or fail to improve. No future appointments. Subjective:      Patient ID: Tayler Nunez is a 41 y.o. female. Chief Complaint   Patient presents with   • Sore Throat     White spots on throat. Symptoms started on Tuesday [facility], LPN     • Earache     Both ears hurt         Vitals:  /84   Pulse 69   Temp 97.5 °F (36.4 °C)   Resp 17   Ht 5' 4" (1.626 m)   Wt 88.1 kg (194 lb 4.8 oz)   BMI 33.35 kg/m²     Patient stated that started with sore throat couple of days ago and progressed to congestion and denies fever, chills and sob. Taking flonase nasal spray and will continue with that. Will also start clairtin OTC    Sore Throat   This is a new problem. The current episode started yesterday. The problem has been gradually worsening. Neither side of throat is experiencing more pain than the other. There has been no fever. The pain is at a severity of 7/10. Associated symptoms include congestion and ear pain. Pertinent negatives include no abdominal pain, coughing, diarrhea, drooling, ear discharge, headaches, hoarse voice, plugged ear sensation, neck pain, shortness of breath, stridor, swollen glands, trouble swallowing or vomiting. She has had no exposure to strep or mono.          Recent Results (from the past 24 hour(s))   POCT Rapid Covid Ag Collection Time: 12/07/23 11:00 AM   Result Value Ref Range    POCT SARS-CoV-2 Ag Negative Negative    VALID CONTROL Valid    POCT rapid strepA    Collection Time: 12/07/23 11:01 AM   Result Value Ref Range     RAPID STREP A Negative Negative           PHQ-2/9 Depression Screening    Little interest or pleasure in doing things: 0 - not at all  Feeling down, depressed, or hopeless: 0 - not at all  PHQ-2 Score: 0  PHQ-2 Interpretation: Negative depression screen             The following portions of the patient's history were reviewed and updated as appropriate: allergies, current medications, past family history, past medical history, past social history, past surgical history and problem list.      Review of Systems   Constitutional:  Negative for chills, diaphoresis, fatigue, fever and unexpected weight change. HENT:  Positive for congestion, ear pain and sore throat. Negative for dental problem, drooling, ear discharge, facial swelling, hearing loss, hoarse voice, mouth sores, nosebleeds, postnasal drip, rhinorrhea, sinus pressure, sinus pain, sneezing, tinnitus, trouble swallowing and voice change. Respiratory:  Negative for cough, chest tightness, shortness of breath, wheezing and stridor. Cardiovascular: Negative. Gastrointestinal:  Negative for abdominal pain, constipation, diarrhea, nausea and vomiting. Musculoskeletal: Negative. Negative for neck pain. Skin: Negative. Neurological:  Negative for dizziness, light-headedness and headaches.          Objective:    Social History     Tobacco Use   Smoking Status Never   Smokeless Tobacco Never       Allergies: No Known Allergies      Current Outpatient Medications   Medication Sig Dispense Refill   • methylPREDNISolone 4 MG tablet therapy pack Use as directed on package 21 tablet 0   • selenium sulfide (SELSUN) 2.5 % shampoo Lather whole body for 10 minutes daily for 7 days then 3x/week for prevention 120 mL 5     No current facility-administered medications for this visit. Physical Exam  Vitals reviewed. Constitutional:       Appearance: Normal appearance. She is well-developed. HENT:      Head: Normocephalic. Right Ear: Tympanic membrane, ear canal and external ear normal.      Left Ear: Tympanic membrane, ear canal and external ear normal.      Nose: Mucosal edema present. Right Sinus: No maxillary sinus tenderness or frontal sinus tenderness. Left Sinus: No maxillary sinus tenderness or frontal sinus tenderness. Comments: Post nasal drip noted     Mouth/Throat:      Mouth: No oral lesions. Pharynx: No oropharyngeal exudate or posterior oropharyngeal erythema. Cardiovascular:      Rate and Rhythm: Normal rate and regular rhythm. Heart sounds: Normal heart sounds. Pulmonary:      Effort: Pulmonary effort is normal.      Breath sounds: Normal breath sounds. Musculoskeletal:         General: Normal range of motion. Cervical back: Neck supple. Lymphadenopathy:      Cervical:      Right cervical: No superficial or posterior cervical adenopathy. Left cervical: No superficial or posterior cervical adenopathy. Skin:     General: Skin is warm and dry. Neurological:      Mental Status: She is alert and oriented to person, place, and time. Psychiatric:         Behavior: Behavior normal.         Thought Content:  Thought content normal.         Judgment: Judgment normal.                     CALLIE Joseph

## 2024-07-05 ENCOUNTER — OFFICE VISIT (OUTPATIENT)
Dept: URGENT CARE | Facility: CLINIC | Age: 39
End: 2024-07-05
Payer: COMMERCIAL

## 2024-07-05 VITALS
WEIGHT: 201.2 LBS | TEMPERATURE: 94.5 F | HEART RATE: 60 BPM | SYSTOLIC BLOOD PRESSURE: 100 MMHG | HEIGHT: 64 IN | RESPIRATION RATE: 18 BRPM | OXYGEN SATURATION: 98 % | BODY MASS INDEX: 34.35 KG/M2 | DIASTOLIC BLOOD PRESSURE: 70 MMHG

## 2024-07-05 DIAGNOSIS — S96.912A MUSCLE STRAIN OF LEFT FOOT, INITIAL ENCOUNTER: Primary | ICD-10-CM

## 2024-07-05 PROCEDURE — 99213 OFFICE O/P EST LOW 20 MIN: CPT | Performed by: FAMILY MEDICINE

## 2024-07-05 RX ORDER — FLUTICASONE PROPIONATE 50 MCG
1 SPRAY, SUSPENSION (ML) NASAL DAILY PRN
COMMUNITY

## 2024-07-05 NOTE — PROGRESS NOTES
[facility]        NAME: Dione Da Silva is a 39 y.o. female  : 1985    MRN: [id number]  DATE: 2024  TIME: 11:03 AM    Assessment and Plan   Muscle strain of left foot, initial encounter [S96.912A]  1. Muscle strain of left foot, initial encounter          No concerns for fracture.  Likely strained some ligaments within the left foot.  Ace wrap applied.  Advised on supportive measures including elevation, OTC pain relievers, icing and rest.    Patient Instructions     Follow up with PCP in 3-5 days.  Proceed to  ER if symptoms worsen.    If tests have been performed at [facility], our office will contact you with results if changes need to be made to the care plan discussed with you at the visit.  [facility]    Chief Complaint     Chief Complaint   Patient presents with    Ankle Injury     Twisted L ankle last night on a walnut seed - fell to ground. Has pain anterior L ankle and Achilles area. No swelling medial/lateral ankle. Pain worse when bends foot - stairs. Used KT tape and ACE wrap. Took Advil         History of Present Illness       39-year-old female presents today with a left foot and ankle injury sustained last night.  Was enjoying the fireworks when she stepped on a walnut causing her to twist her foot and ankle.  Tried some kinesio therapy tape which was not effective and presents today for further evaluation and management.    Ankle Injury     Ankle Swelling  Associated symptoms include arthralgias. Pertinent negatives include no abdominal pain, chest pain, chills, coughing, fever, headaches, joint swelling or nausea. The symptoms are aggravated by weight bearing.       Review of Systems   Review of Systems   Constitutional:  Negative for chills and fever.   Respiratory:  Negative for cough and shortness of breath.    Cardiovascular:  Negative for chest pain.   Gastrointestinal:  Negative for abdominal pain and nausea.  "  Musculoskeletal:  Positive for arthralgias and gait problem. Negative for joint swelling.   Skin:  Negative for color change.   Neurological:  Negative for dizziness and headaches.     Current Medications       Current Outpatient Medications:     fluticasone (FLONASE) 50 mcg/act nasal spray, 1 spray into each nostril daily as needed for rhinitis, Disp: , Rfl:     selenium sulfide (SELSUN) 2.5 % shampoo, Lather whole body for 10 minutes daily for 7 days then 3x/week for prevention, Disp: 120 mL, Rfl: 5    Current Allergies     Allergies as of 07/05/2024    (No Known Allergies)            The following portions of the patient's history were reviewed and updated as appropriate: allergies, current medications, past family history, past medical history, past social history, past surgical history and problem list.     Past Medical History:   Diagnosis Date    Allergic rhinitis        Past Surgical History:   Procedure Laterality Date    NO PAST SURGERIES         No family history on file.      Medications have been verified.        Objective   /70   Pulse 60   Temp (!) 94.5 °F (34.7 °C)   Resp 18   Ht 5' 4\" (1.626 m)   Wt 91.3 kg (201 lb 3.2 oz)   LMP 06/25/2024 Comment: irregular  SpO2 98%   BMI 34.54 kg/m²   Patient's last menstrual period was 06/25/2024.       Physical Exam     Physical Exam  Vitals and nursing note reviewed.   Constitutional:       General: She is in acute distress.      Appearance: Normal appearance. She is not ill-appearing, toxic-appearing or diaphoretic.   HENT:      Head: Normocephalic and atraumatic.   Eyes:      General:         Right eye: No discharge.         Left eye: No discharge.      Conjunctiva/sclera: Conjunctivae normal.   Pulmonary:      Effort: Pulmonary effort is normal.   Musculoskeletal:         General: Signs of injury present. No swelling, tenderness or deformity. Normal range of motion.      Comments: Some discomfort with twisting the foot.   Skin:     General: " Skin is warm.   Neurological:      Mental Status: She is alert.   Psychiatric:         Mood and Affect: Mood normal.         Behavior: Behavior normal.         Thought Content: Thought content normal.         Judgment: Judgment normal.

## 2024-07-07 ENCOUNTER — TELEPHONE (OUTPATIENT)
Dept: FAMILY MEDICINE CLINIC | Facility: CLINIC | Age: 39
End: 2024-07-07

## 2024-07-07 RX ORDER — LEVONORGESTREL 52 MG/1
INTRAUTERINE DEVICE INTRAUTERINE
COMMUNITY

## 2024-07-07 RX ORDER — METHYLPREDNISOLONE 4 MG/1
TABLET ORAL
COMMUNITY
End: 2024-07-11

## 2024-07-11 ENCOUNTER — OFFICE VISIT (OUTPATIENT)
Dept: FAMILY MEDICINE CLINIC | Facility: CLINIC | Age: 39
End: 2024-07-11
Payer: COMMERCIAL

## 2024-07-11 VITALS
TEMPERATURE: 98.7 F | HEART RATE: 60 BPM | WEIGHT: 199 LBS | BODY MASS INDEX: 33.97 KG/M2 | DIASTOLIC BLOOD PRESSURE: 70 MMHG | SYSTOLIC BLOOD PRESSURE: 122 MMHG | RESPIRATION RATE: 18 BRPM | HEIGHT: 64 IN

## 2024-07-11 DIAGNOSIS — Z13.1 SCREENING FOR DIABETES MELLITUS (DM): ICD-10-CM

## 2024-07-11 DIAGNOSIS — E66.9 OBESITY (BMI 30-39.9): ICD-10-CM

## 2024-07-11 DIAGNOSIS — Z13.220 SCREENING FOR LIPID DISORDERS: ICD-10-CM

## 2024-07-11 DIAGNOSIS — Z00.00 HEALTHCARE MAINTENANCE: Primary | ICD-10-CM

## 2024-07-11 PROCEDURE — 99395 PREV VISIT EST AGE 18-39: CPT | Performed by: FAMILY MEDICINE

## 2024-07-11 PROCEDURE — 99214 OFFICE O/P EST MOD 30 MIN: CPT | Performed by: FAMILY MEDICINE

## 2024-07-11 NOTE — PROGRESS NOTES
Assessment/Plan:    No problem-specific Assessment & Plan notes found for this encounter.    Cpe  Diet/exercise in progress per pt  Screening labs ordered  Adacel utd  Does not seem to have high risk factors for breast CA or colon CA, routine screening for cscope at 47 and mammo at 42 suggested    Several issues, separate from prevention, were discussed and addressed today, and therefore coded separately from the Preventative Visit:    Bmi 34  Discussed weight loss tx options and she is leaning toward injectables  Advised of risks and benefits of all options and adjunctive use and possible side effects and interval of monitoring and f/u namely q3m for GLP1ra and monthly for phentermine  Pt to check for coverage and availability  Discussed bariatric surgical referral option    Pt later contacted me re Zepbound as covered option  Will start at 2.5mg for 4 doses and increase per protocol  Office f/u q3m after starting aware     Diagnoses and all orders for this visit:    Healthcare maintenance    Screening for diabetes mellitus (DM)  -     Comprehensive metabolic panel; Future    Screening for lipid disorders  -     Lipid Panel with Direct LDL reflex; Future    Obesity (BMI 30-39.9)  -     TSH, 3rd generation; Future    BMI 34.0-34.9,adult    Other orders  -     Discontinue: methylPREDNISolone 4 MG tablet therapy pack; use as directed for 6 days (Patient not taking: Reported on 7/11/2024)  -     levonorgestrel (Liletta, 52 MG,) 20.1 mcg/day IUD; Take by intrauterine route.        Return in about 3 months (around 10/11/2024) for zepbound followup.    Subjective:      Patient ID: Dione Da Silva is a 39 y.o. female.    Chief Complaint   Patient presents with    Physical Exam     YC        HPI    Maternal grandfather with colon CA age 48, some environmental  Mother is ok at 67, cscope     Mother breast CA at 66    Trouble with weight loss  Desk job  Did diet, low calorie and low carb  Did go to [facility] bariatric supervised  "option  Friend on phentermine    Gyn utd  Periods not heavy  Mvi daily    1 biologic child  No GDM or PIH    The following portions of the patient's history were reviewed and updated as appropriate: allergies, current medications, past family history, past medical history, past social history, past surgical history and problem list.    Review of Systems   Constitutional:  Negative for chills and fever.         Current Outpatient Medications   Medication Sig Dispense Refill    fluticasone (FLONASE) 50 mcg/act nasal spray 1 spray into each nostril daily as needed for rhinitis      levonorgestrel (Liletta, 52 MG,) 20.1 mcg/day IUD Take by intrauterine route.      selenium sulfide (SELSUN) 2.5 % shampoo Lather whole body for 10 minutes daily for 7 days then 3x/week for prevention 120 mL 5     No current facility-administered medications for this visit.       Objective:    /70   Pulse 60   Temp 98.7 °F (37.1 °C) (Tympanic)   Resp 18   Ht 5' 4\" (1.626 m)   Wt 90.3 kg (199 lb)   LMP 06/25/2024 Comment: irregular  BMI 34.16 kg/m²        Physical Exam  Vitals and nursing note reviewed.   Constitutional:       General: She is not in acute distress.     Appearance: She is well-developed. She is obese. She is not ill-appearing.   HENT:      Head: Normocephalic.      Right Ear: Tympanic membrane normal.      Left Ear: Tympanic membrane normal.   Eyes:      General: No scleral icterus.     Conjunctiva/sclera: Conjunctivae normal.   Neck:      Thyroid: No thyromegaly.   Cardiovascular:      Rate and Rhythm: Normal rate and regular rhythm.      Heart sounds: No murmur heard.  Pulmonary:      Effort: Pulmonary effort is normal. No respiratory distress.   Abdominal:      General: There is no distension.      Palpations: Abdomen is soft.      Tenderness: There is no abdominal tenderness.   Musculoskeletal:         General: No deformity.      Cervical back: Neck supple.      Right lower leg: No edema.      Left lower leg: " No edema.   Skin:     General: Skin is warm and dry.      Coloration: Skin is not pale.   Neurological:      Mental Status: She is alert.      Motor: No weakness.      Gait: Gait normal.   Psychiatric:         Mood and Affect: Mood normal.         Behavior: Behavior normal.         Thought Content: Thought content normal.                Adin Modi DO

## 2024-07-15 DIAGNOSIS — E66.9 OBESITY (BMI 30-39.9): Primary | ICD-10-CM

## 2024-07-15 RX ORDER — TIRZEPATIDE 2.5 MG/.5ML
2.5 INJECTION, SOLUTION SUBCUTANEOUS WEEKLY
Qty: 2 ML | Refills: 0 | Status: SHIPPED | OUTPATIENT
Start: 2024-07-15 | End: 2024-08-12

## 2024-07-22 ENCOUNTER — TELEPHONE (OUTPATIENT)
Age: 39
End: 2024-07-22

## 2024-07-22 NOTE — TELEPHONE ENCOUNTER
PA for tirzepatide (Zepbound) 2.5 mg/0.5 mL auto-injector    Submitted via    []CMM-KEY   [x][employer]-Case ID # [id number]  []Faxed to plan   []Other website   []Phone call Case ID #     Office notes sent, clinical questions answered. Awaiting determination    Turnaround time for your insurance to make a decision on your Prior Authorization can take 7-21 business days.

## 2024-07-25 NOTE — TELEPHONE ENCOUNTER
PA for tirzepatide (Zepbound) 2.5 mg/0.5 mL auto-injector Denied / Excluded    Reason: Plan Exclusion        Message sent to office clinical pool Yes    Denial letter scanned into Media Yes    Appeal started No     **Please follow up with your patient regarding denial and next steps**

## 2024-08-09 DIAGNOSIS — E66.9 OBESITY (BMI 30-39.9): ICD-10-CM

## 2024-08-09 NOTE — TELEPHONE ENCOUNTER
Patient requesting refills to not have to call in every month and she will be paying out of pocket for this medication.     Reason for call:   [x] Refill   [] Prior Auth  [] Other:     Office:   [x] PCP/Provider - Adin Modi DO   [] Specialty/Provider -     Medication: tirzepatide (Zepbound) 2.5 mg/0.5 mL auto-injector / Inject 0.5 mL (2.5 mg total) under the skin once a week for 28 day     Pharmacy: [id number] - [address] [place] [address]      Does the patient have enough for 3 days?   [x] Yes   [] No - Send as HP to POD

## 2024-08-10 PROBLEM — Z00.00 HEALTHCARE MAINTENANCE: Status: RESOLVED | Noted: 2024-07-11 | Resolved: 2024-08-10

## 2024-08-10 RX ORDER — TIRZEPATIDE 5 MG/.5ML
5 INJECTION, SOLUTION SUBCUTANEOUS WEEKLY
Qty: 2 ML | Refills: 0 | Status: SHIPPED | OUTPATIENT
Start: 2024-08-10

## 2024-09-11 DIAGNOSIS — E66.9 OBESITY (BMI 30-39.9): ICD-10-CM

## 2024-09-11 NOTE — TELEPHONE ENCOUNTER
Reason for call:     Patient asking if med could be sent today since pharmacy will have to order it.      [x] Refill   [] Prior Auth  [] Other:     Office:   [x] PCP/Provider - lombardi  [] Specialty/Provider -     Medication:   Zepbound 5 mg        Pharmacy:   [facility] [place]    Does the patient have enough for 3 days?   [] Yes   [x] No - Send as HP to POD

## 2024-09-13 DIAGNOSIS — E66.9 OBESITY (BMI 30-39.9): ICD-10-CM

## 2024-09-13 RX ORDER — TIRZEPATIDE 5 MG/.5ML
5 INJECTION, SOLUTION SUBCUTANEOUS WEEKLY
Qty: 2 ML | Refills: 0 | Status: SHIPPED | OUTPATIENT
Start: 2024-09-13 | End: 2024-09-17

## 2024-09-13 NOTE — TELEPHONE ENCOUNTER
Patient called to request the status of  a refill for their Zepbound advised a refill was requested on 09/11/24 and is pending approval. Patient verbalized understanding and is in agreement.   Informed patient that a message was left for her to schedule an appointment. Patient stated that she will call to schedule

## 2024-09-17 ENCOUNTER — OFFICE VISIT (OUTPATIENT)
Dept: FAMILY MEDICINE CLINIC | Facility: CLINIC | Age: 39
End: 2024-09-17
Payer: COMMERCIAL

## 2024-09-17 VITALS
WEIGHT: 183.4 LBS | RESPIRATION RATE: 16 BRPM | HEIGHT: 64 IN | HEART RATE: 60 BPM | BODY MASS INDEX: 31.31 KG/M2 | DIASTOLIC BLOOD PRESSURE: 76 MMHG | SYSTOLIC BLOOD PRESSURE: 114 MMHG | TEMPERATURE: 97.8 F

## 2024-09-17 DIAGNOSIS — B36.0 TV (TINEA VERSICOLOR): Primary | ICD-10-CM

## 2024-09-17 DIAGNOSIS — E66.9 OBESITY (BMI 30-39.9): ICD-10-CM

## 2024-09-17 PROCEDURE — 99213 OFFICE O/P EST LOW 20 MIN: CPT | Performed by: FAMILY MEDICINE

## 2024-09-17 RX ORDER — TIRZEPATIDE 7.5 MG/.5ML
7.5 INJECTION, SOLUTION SUBCUTANEOUS WEEKLY
Qty: 2 ML | Refills: 0 | Status: SHIPPED | OUTPATIENT
Start: 2024-09-17 | End: 2024-10-17

## 2024-09-17 RX ORDER — SELENIUM SULFIDE 2.5 MG/100ML
LOTION TOPICAL
Qty: 120 ML | Refills: 5 | Status: SHIPPED | OUTPATIENT
Start: 2024-09-17

## 2024-09-17 NOTE — PROGRESS NOTES
Assessment/Plan:    1. TV (tinea versicolor)  Assessment & Plan:  Still uses the selenium when needed  Orders:  -     selenium sulfide (SELSUN) 2.5 % shampoo; Lather whole body for 10 minutes daily for 7 days then 3x/week for prevention  2. Obesity (BMI 30-39.9)  -     tirzepatide (Zepbound) 7.5 mg/0.5 mL auto-injector; Inject 0.5 mL (7.5 mg total) under the skin once a week  3. BMI 34.0-34.9,adult  -     tirzepatide (Zepbound) 7.5 mg/0.5 mL auto-injector; Inject 0.5 mL (7.5 mg total) under the skin once a week          There are no Patient Instructions on file for this visit.    Return in about 4 weeks (around 10/15/2024).    Subjective:      Patient ID: Dione Da Silva is a 39 y.o. female.    Chief Complaint   Patient presents with    Medication Management     Nicole Kay CMA        Pt states she has been seeing Dr FELIX for weight loss management.  Needed to make an appt for follow up and states she was told she was given an appt with me.  Pt is on 5 mg   Pt tolerating the dose well          The following portions of the patient's history were reviewed and updated as appropriate: allergies, current medications, past family history, past medical history, past social history, past surgical history and problem list.    Review of Systems   Constitutional: Negative.  Negative for activity change, appetite change, chills, diaphoresis and fatigue.   HENT: Negative.  Negative for dental problem, ear pain, sinus pressure and sore throat.    Eyes: Negative.  Negative for photophobia, pain, discharge, redness, itching and visual disturbance.   Respiratory:  Negative for apnea and chest tightness.    Cardiovascular: Negative.  Negative for chest pain, palpitations and leg swelling.   Gastrointestinal: Negative.  Negative for abdominal distention, abdominal pain, constipation and diarrhea.   Endocrine: Negative.  Negative for cold intolerance and heat intolerance.   Genitourinary: Negative.  Negative for difficulty urinating  "and dyspareunia.   Musculoskeletal: Negative.  Negative for arthralgias and back pain.   Skin: Negative.    Allergic/Immunologic: Negative for environmental allergies.   Neurological: Negative.  Negative for dizziness.   Psychiatric/Behavioral: Negative.  Negative for agitation.          Current Outpatient Medications   Medication Sig Dispense Refill    fluticasone (FLONASE) 50 mcg/act nasal spray 1 spray into each nostril daily as needed for rhinitis      levonorgestrel (Liletta, 52 MG,) 20.1 mcg/day IUD Take by intrauterine route.      selenium sulfide (SELSUN) 2.5 % shampoo Lather whole body for 10 minutes daily for 7 days then 3x/week for prevention 120 mL 5    tirzepatide (Zepbound) 7.5 mg/0.5 mL auto-injector Inject 0.5 mL (7.5 mg total) under the skin once a week 2 mL 0     No current facility-administered medications for this visit.       Objective:    /76   Pulse 60   Temp 97.8 °F (36.6 °C)   Resp 16   Ht 5' 4\" (1.626 m)   Wt 83.2 kg (183 lb 6.4 oz)   BMI 31.48 kg/m²        Physical Exam  Vitals and nursing note reviewed.   Constitutional:       General: She is not in acute distress.     Appearance: She is well-developed. She is not diaphoretic.   HENT:      Head: Normocephalic and atraumatic.      Right Ear: External ear normal.      Left Ear: External ear normal.      Nose: Nose normal.      Mouth/Throat:      Pharynx: No oropharyngeal exudate.   Eyes:      General: No scleral icterus.        Right eye: No discharge.         Left eye: No discharge.      Pupils: Pupils are equal, round, and reactive to light.   Neck:      Thyroid: No thyromegaly.   Cardiovascular:      Rate and Rhythm: Normal rate.      Heart sounds: Normal heart sounds. No murmur heard.  Pulmonary:      Effort: Pulmonary effort is normal. No respiratory distress.      Breath sounds: Normal breath sounds. No wheezing.   Abdominal:      General: Bowel sounds are normal. There is no distension.      Palpations: Abdomen is soft. " There is no mass.      Tenderness: There is no abdominal tenderness. There is no guarding or rebound.   Musculoskeletal:         General: Normal range of motion.   Skin:     General: Skin is warm and dry.      Findings: No erythema or rash.   Neurological:      Mental Status: She is alert.      Coordination: Coordination normal.      Deep Tendon Reflexes: Reflexes normal.   Psychiatric:         Behavior: Behavior normal.                Frank Lombardi, DO

## 2024-10-04 DIAGNOSIS — E66.9 OBESITY (BMI 30-39.9): ICD-10-CM

## 2024-10-04 NOTE — TELEPHONE ENCOUNTER
Reason for call:   [x] Refill -patient pays out of pocket so no prior auth needed   [] Prior Auth  [] Other:     Office:   [x] PCP/Provider -   [] Specialty/Provider -     Medication: tirzepatide (Zepbound) 7.5 mg/0.5 mL auto-injector     Dose/Frequency:  Inject 0.5 mL (7.5 mg total) under the skin once a week     Quantity: 2mL    Pharmacy: [id number] [address] [place]     Does the patient have enough for 3 days?   [x] Yes   [] No - Send as HP to POD

## 2024-10-09 RX ORDER — TIRZEPATIDE 7.5 MG/.5ML
7.5 INJECTION, SOLUTION SUBCUTANEOUS WEEKLY
Qty: 2 ML | Refills: 0 | Status: SHIPPED | OUTPATIENT
Start: 2024-10-09 | End: 2024-11-08

## 2024-11-02 ENCOUNTER — TELEPHONE (OUTPATIENT)
Dept: FAMILY MEDICINE CLINIC | Facility: CLINIC | Age: 39
End: 2024-11-02

## 2024-11-03 NOTE — TELEPHONE ENCOUNTER
"Has appt with me on 11/5/24 for one month f/u    Was seen by her PCP Dr. Lombardi on 9/17/24 and instructed:    \"Return in about 4 weeks (around 10/15/2024). \"    Please re-schedule with Dr. Lombardi.  "

## 2024-11-05 ENCOUNTER — OFFICE VISIT (OUTPATIENT)
Dept: FAMILY MEDICINE CLINIC | Facility: CLINIC | Age: 39
End: 2024-11-05
Payer: COMMERCIAL

## 2024-11-05 VITALS
BODY MASS INDEX: 29.37 KG/M2 | WEIGHT: 172 LBS | SYSTOLIC BLOOD PRESSURE: 106 MMHG | TEMPERATURE: 97.8 F | RESPIRATION RATE: 16 BRPM | HEART RATE: 66 BPM | DIASTOLIC BLOOD PRESSURE: 80 MMHG | HEIGHT: 64 IN

## 2024-11-05 DIAGNOSIS — E66.9 OBESITY (BMI 30-39.9): ICD-10-CM

## 2024-11-05 DIAGNOSIS — J31.0 CHRONIC RHINITIS: Primary | ICD-10-CM

## 2024-11-05 PROCEDURE — 99214 OFFICE O/P EST MOD 30 MIN: CPT | Performed by: FAMILY MEDICINE

## 2024-11-05 RX ORDER — FLUTICASONE PROPIONATE 50 MCG
1 SPRAY, SUSPENSION (ML) NASAL DAILY PRN
Qty: 48 G | Refills: 3 | Status: SHIPPED | OUTPATIENT
Start: 2024-11-05

## 2024-11-05 RX ORDER — TIRZEPATIDE 7.5 MG/.5ML
7.5 INJECTION, SOLUTION SUBCUTANEOUS WEEKLY
Qty: 2 ML | Refills: 5 | Status: SHIPPED | OUTPATIENT
Start: 2024-11-05

## 2024-11-05 NOTE — PROGRESS NOTES
Assessment/Plan:    No problem-specific Assessment & Plan notes found for this encounter.    Hx of obesity  Responding well to zepbound but hesistant to titrate up to goal dose  Can continue 7.5mg/w if responding but aware off protocol  Q3m monitoring suggested  Discussed oral option of phentermine with monthly f/u vs contrave with q3m f/u if decides to change  Risks/benefits of those options discussed but not indicated for wt loss maintenance    Rhinitis stable  Rx flonase given     Diagnoses and all orders for this visit:    Chronic rhinitis  -     fluticasone (FLONASE) 50 mcg/act nasal spray; 1 spray into each nostril daily as needed for rhinitis    Obesity (BMI 30-39.9)  -     tirzepatide (Zepbound) 7.5 mg/0.5 mL auto-injector; Inject 0.5 mL (7.5 mg total) under the skin once a week    BMI 29.0-29.9,adult        Return if symptoms worsen or fail to improve.    Subjective:      Patient ID: Dione Da Silva is a 39 y.o. female.    Chief Complaint   Patient presents with    Medication Management     Tg Martin MA        HPI  Been paying on own for zepbound, approx $500 per month  Nausea for 2d after dose at 7.5mg then ok  Plans to use for a few months  Considering staying on 7.5mg since losing wt on it  Start wt was 199  Down 27#    The following portions of the patient's history were reviewed and updated as appropriate: allergies, current medications, past family history, past medical history, past social history, past surgical history and problem list.    Review of Systems   Gastrointestinal:  Positive for nausea. Negative for constipation and diarrhea.         Current Outpatient Medications   Medication Sig Dispense Refill    fluticasone (FLONASE) 50 mcg/act nasal spray 1 spray into each nostril daily as needed for rhinitis 48 g 3    levonorgestrel (Liletta, 52 MG,) 20.1 mcg/day IUD Take by intrauterine route.      selenium sulfide (SELSUN) 2.5 % shampoo Lather whole body for 10 minutes daily for 7 days  "then 3x/week for prevention 120 mL 5    tirzepatide (Zepbound) 7.5 mg/0.5 mL auto-injector Inject 0.5 mL (7.5 mg total) under the skin once a week 2 mL 5     No current facility-administered medications for this visit.       Objective:    /80   Pulse 66   Temp 97.8 °F (36.6 °C)   Resp 16   Ht 5' 4\" (1.626 m)   Wt 78 kg (172 lb)   BMI 29.52 kg/m²        Physical Exam  Vitals and nursing note reviewed.   Constitutional:       Appearance: She is well-developed. She is not ill-appearing.   HENT:      Head: Normocephalic.      Right Ear: Tympanic membrane normal.      Left Ear: Tympanic membrane normal.      Nose: Congestion present.   Eyes:      General: No scleral icterus.     Conjunctiva/sclera: Conjunctivae normal.   Cardiovascular:      Rate and Rhythm: Normal rate and regular rhythm.      Heart sounds: No murmur heard.  Pulmonary:      Effort: Pulmonary effort is normal. No respiratory distress.      Breath sounds: No wheezing.   Abdominal:      Palpations: Abdomen is soft.   Musculoskeletal:         General: No deformity.      Cervical back: Neck supple.      Right lower leg: No edema.      Left lower leg: No edema.   Skin:     General: Skin is warm and dry.      Coloration: Skin is not pale.   Neurological:      Mental Status: She is alert.      Motor: No weakness.      Gait: Gait normal.   Psychiatric:         Mood and Affect: Mood normal.         Behavior: Behavior normal.         Thought Content: Thought content normal.                Adin Modi, DO    "

## 2025-01-31 ENCOUNTER — OFFICE VISIT (OUTPATIENT)
Dept: FAMILY MEDICINE CLINIC | Facility: CLINIC | Age: 40
End: 2025-01-31
Payer: COMMERCIAL

## 2025-01-31 VITALS
SYSTOLIC BLOOD PRESSURE: 130 MMHG | DIASTOLIC BLOOD PRESSURE: 88 MMHG | WEIGHT: 161.2 LBS | BODY MASS INDEX: 27.52 KG/M2 | RESPIRATION RATE: 18 BRPM | HEART RATE: 60 BPM | TEMPERATURE: 97.8 F | HEIGHT: 64 IN

## 2025-01-31 DIAGNOSIS — J31.0 CHRONIC RHINITIS: ICD-10-CM

## 2025-01-31 DIAGNOSIS — J01.00 ACUTE NON-RECURRENT MAXILLARY SINUSITIS: Primary | ICD-10-CM

## 2025-01-31 DIAGNOSIS — E66.9 OBESITY (BMI 30-39.9): ICD-10-CM

## 2025-01-31 DIAGNOSIS — K12.30 MUCOSITIS ORAL: ICD-10-CM

## 2025-01-31 PROCEDURE — 99214 OFFICE O/P EST MOD 30 MIN: CPT | Performed by: FAMILY MEDICINE

## 2025-01-31 RX ORDER — CEFDINIR 300 MG/1
600 CAPSULE ORAL DAILY
Qty: 20 CAPSULE | Refills: 0 | Status: SHIPPED | OUTPATIENT
Start: 2025-01-31 | End: 2025-02-10

## 2025-01-31 RX ORDER — TIRZEPATIDE 10 MG/.5ML
10 INJECTION, SOLUTION SUBCUTANEOUS WEEKLY
Qty: 2 ML | Refills: 3 | Status: SHIPPED | OUTPATIENT
Start: 2025-01-31 | End: 2025-02-05

## 2025-01-31 NOTE — PROGRESS NOTES
"Assessment/Plan:    {There are no diagnoses linked to this encounter. (Refresh or delete this SmartLink)}                  No follow-ups on file.    Subjective:      Patient ID: Dione Da Silva is a 39 y.o. female.    Chief Complaint   Patient presents with    Sore Throat     2 weeks  Latrice Baird MA       HPI  Throat discomfort  2w  Swelling?  Swallowing ok  Post nasal drip  Clear  No fever   sick for 1 day  Swelling under tongue  Nonsmoker, no tob products  About 1m        The following portions of the patient's history were reviewed and updated as appropriate: allergies, current medications, past family history, past medical history, past social history, past surgical history and problem list.    Review of Systems      Current Outpatient Medications   Medication Sig Dispense Refill    fluticasone (FLONASE) 50 mcg/act nasal spray 1 spray into each nostril daily as needed for rhinitis 48 g 3    levonorgestrel (Liletta, 52 MG,) 20.1 mcg/day IUD Take by intrauterine route.      selenium sulfide (SELSUN) 2.5 % shampoo Lather whole body for 10 minutes daily for 7 days then 3x/week for prevention 120 mL 5    tirzepatide (Zepbound) 7.5 mg/0.5 mL auto-injector Inject 0.5 mL (7.5 mg total) under the skin once a week 2 mL 5     No current facility-administered medications for this visit.       Objective:    /88   Pulse 60   Temp 97.8 °F (36.6 °C)   Resp 18   Ht 5' 4\" (1.626 m)   Wt 73.1 kg (161 lb 3.2 oz)   BMI 27.67 kg/m²        Physical Exam           Adin Modi,     "

## 2025-02-01 NOTE — PROGRESS NOTES
Name: Dione Da Silva      : 1985      MRN: [id number]  Encounter Provider: Adin Modi DO  Encounter Date: 2025   Encounter department: [facility]  :  Assessment & Plan  Acute non-recurrent maxillary sinusitis  New  Mucinex D and abx  Orders:    cefdinir (OMNICEF) 300 mg capsule; Take 2 capsules (600 mg total) by mouth daily for 10 days (May take 2 pills together once a day)    Obesity (BMI 30-39.9)  Tolerates current dose but aware off protocol  Willing to retry 10mg for extended time beyond 4w but protocol again advised  Mtc/pancreatitis risk aware  F/u q3m      Orders:    Tirzepatide (Mounjaro) 10 MG/0.5ML SOAJ; Inject 10 mg under the skin once a week    BMI 27.0-27.9,adult  improving       Chronic rhinitis  Stable on flonase       Mucositis oral  New  Monitor response on cefdinir  Dentist if no better  No tob product use          History of Present Illness   Sore Throat   This is a new problem. The current episode started in the past 7 days. The problem has been waxing and waning. Neither side of throat is experiencing more pain than the other. There has been no fever. The pain is mild. Associated symptoms include trouble swallowing. Pertinent negatives include no abdominal pain, congestion, coughing, diarrhea, drooling, ear discharge, ear pain, headaches, hoarse voice, plugged ear sensation, neck pain, shortness of breath, stridor, swollen glands or vomiting.     Throat discomfort  2w  Swelling?  Swallowing ok  Post nasal drip  Clear  No fever   sick for 1 day  Swelling under tongue  Nonsmoker, no tob products  About 1m of tongue symptoms  Plans to make dentist appt for eval  No food or dental product changes    On zepbound  Tolerating 7.5mg for last few months which pt aware is not protocol but chooses  stay on for tolerability and pays for it herself  Has lost another 10#  Does still get nausea    Review of Systems   HENT:  Positive for sore throat and trouble  "swallowing. Negative for congestion, drooling, ear discharge, ear pain and hoarse voice.    Respiratory:  Negative for cough, shortness of breath and stridor.    Gastrointestinal:  Negative for abdominal pain, diarrhea and vomiting.   Musculoskeletal:  Negative for neck pain.   Neurological:  Negative for headaches.       History reviewed. No pertinent family history.   Social History     Tobacco Use    Smoking status: Never     Passive exposure: Never    Smokeless tobacco: Never   Vaping Use    Vaping status: Never Used   Substance Use Topics    Alcohol use: Yes     Comment: social    Drug use: Never      Current Outpatient Medications   Medication Instructions    cefdinir (OMNICEF) 600 mg, Oral, Daily, (May take 2 pills together once a day)    fluticasone (FLONASE) 50 mcg/act nasal spray 1 spray, Nasal, Daily PRN    levonorgestrel (Liletta, 52 MG,) 20.1 mcg/day IUD Take by intrauterine route.    Mounjaro 10 mg, Subcutaneous, Weekly    selenium sulfide (SELSUN) 2.5 % shampoo Lather whole body for 10 minutes daily for 7 days then 3x/week for prevention       >>>>>>>>  Return if symptoms worsen or fail to improve.    Visit Vitals  /88   Pulse 60   Temp 97.8 °F (36.6 °C)   Resp 18   Ht 5' 4\" (1.626 m)   Wt 73.1 kg (161 lb 3.2 oz)   BMI 27.67 kg/m²   OB Status Implant   Smoking Status Never   BSA 1.78 m²        Objective   /88   Pulse 60   Temp 97.8 °F (36.6 °C)   Resp 18   Ht 5' 4\" (1.626 m)   Wt 73.1 kg (161 lb 3.2 oz)   BMI 27.67 kg/m²      Physical Exam  Vitals and nursing note reviewed.   Constitutional:       General: She is not in acute distress.     Appearance: She is well-developed. She is not ill-appearing.   HENT:      Head: Normocephalic.      Right Ear: Tympanic membrane normal.      Left Ear: Tympanic membrane normal.      Nose: Congestion present.      Mouth/Throat:      Pharynx: No oropharyngeal exudate or posterior oropharyngeal erythema.      Comments: Fullness right sublingual " area  Eyes:      General: No scleral icterus.     Conjunctiva/sclera: Conjunctivae normal.   Cardiovascular:      Rate and Rhythm: Normal rate and regular rhythm.   Pulmonary:      Effort: Pulmonary effort is normal. No respiratory distress.      Breath sounds: No wheezing.   Abdominal:      Palpations: Abdomen is soft.   Musculoskeletal:         General: No deformity.      Cervical back: Neck supple.   Lymphadenopathy:      Cervical: No cervical adenopathy.   Skin:     General: Skin is warm and dry.      Coloration: Skin is not jaundiced or pale.   Neurological:      Mental Status: She is alert.      Motor: No weakness.      Gait: Gait normal.   Psychiatric:         Mood and Affect: Mood normal.         Behavior: Behavior normal.         Thought Content: Thought content normal.

## 2025-02-01 NOTE — ASSESSMENT & PLAN NOTE
Tolerates current dose but aware off protocol  Willing to retry 10mg for extended time beyond 4w but protocol again advised  Mtc/pancreatitis risk aware  F/u q3m      Orders:    Tirzepatide (Mounjaro) 10 MG/0.5ML SOAJ; Inject 10 mg under the skin once a week

## 2025-02-04 ENCOUNTER — TELEPHONE (OUTPATIENT)
Dept: FAMILY MEDICINE CLINIC | Facility: CLINIC | Age: 40
End: 2025-02-04

## 2025-02-05 DIAGNOSIS — E66.9 OBESITY (BMI 30-39.9): ICD-10-CM

## 2025-02-05 NOTE — TELEPHONE ENCOUNTER
Sameer pt  Thinking about changing to contrave  She will call for next dose of zepbound when ready

## 2025-03-07 DIAGNOSIS — Z86.39 HISTORY OF OBESITY: Primary | ICD-10-CM

## 2025-03-07 RX ORDER — NALTREXONE HYDROCHLORIDE AND BUPROPION HYDROCHLORIDE 8; 90 MG/1; MG/1
TABLET, EXTENDED RELEASE ORAL
Qty: 70 TABLET | Refills: 0 | Status: SHIPPED | OUTPATIENT
Start: 2025-03-07

## 2025-03-17 ENCOUNTER — OFFICE VISIT (OUTPATIENT)
Dept: FAMILY MEDICINE CLINIC | Facility: CLINIC | Age: 40
End: 2025-03-17
Payer: COMMERCIAL

## 2025-03-17 VITALS
RESPIRATION RATE: 16 BRPM | SYSTOLIC BLOOD PRESSURE: 124 MMHG | DIASTOLIC BLOOD PRESSURE: 70 MMHG | HEART RATE: 76 BPM | WEIGHT: 159 LBS | BODY MASS INDEX: 27.14 KG/M2 | TEMPERATURE: 97.9 F | HEIGHT: 64 IN

## 2025-03-17 DIAGNOSIS — J02.9 SORE THROAT: Primary | ICD-10-CM

## 2025-03-17 LAB — S PYO AG THROAT QL: NEGATIVE

## 2025-03-17 PROCEDURE — 87880 STREP A ASSAY W/OPTIC: CPT | Performed by: FAMILY MEDICINE

## 2025-03-17 PROCEDURE — 99213 OFFICE O/P EST LOW 20 MIN: CPT | Performed by: FAMILY MEDICINE

## 2025-03-17 RX ORDER — AZELASTINE HYDROCHLORIDE 137 UG/1
SPRAY, METERED NASAL
COMMUNITY
Start: 2025-03-05

## 2025-03-17 NOTE — PROGRESS NOTES
"Name: Dione Da Silva      : 1985      MRN: [id number]  Encounter Provider: Bebe Pavon MD  Encounter Date: 3/17/2025   Encounter department: [facility]  :  Assessment & Plan  Sore throat  Rapid strep negative. Likely viral. Counseled on supportive care including hydration with cool liquids, salt water gargles, chloraseptic spray. Aware to let me know if symptoms worsen or fail to improve.   Orders:    POCT rapid ANTIGEN strepA           History of Present Illness   Sore Throat   This is a new problem. The current episode started yesterday. There has been no fever. Associated symptoms include a hoarse voice, swollen glands and trouble swallowing. Pertinent negatives include no abdominal pain, congestion, coughing, diarrhea, drooling, ear discharge, ear pain, headaches, plugged ear sensation, neck pain, shortness of breath, stridor or vomiting. She has had no exposure to strep or mono. Treatments tried: nasal spray.       Review of Systems   Constitutional: Negative.    HENT:  Positive for hoarse voice, sore throat and trouble swallowing. Negative for congestion, drooling, ear discharge and ear pain.    Eyes: Negative.    Respiratory: Negative.  Negative for cough, shortness of breath and stridor.    Cardiovascular: Negative.    Gastrointestinal: Negative.  Negative for abdominal pain, diarrhea and vomiting.   Endocrine: Negative.    Genitourinary: Negative.    Musculoskeletal: Negative.  Negative for neck pain.   Skin: Negative.    Allergic/Immunologic: Negative.    Neurological: Negative.  Negative for headaches.   Hematological: Negative.    Psychiatric/Behavioral: Negative.         Objective   /70   Pulse 76   Temp 97.9 °F (36.6 °C)   Resp 16   Ht 5' 4\" (1.626 m)   Wt 72.1 kg (159 lb)   BMI 27.29 kg/m²      Physical Exam  Constitutional:       General: She is not in acute distress.     Appearance: Normal appearance. She is normal weight. She is not ill-appearing, " toxic-appearing or diaphoretic.   HENT:      Head: Normocephalic and atraumatic.      Right Ear: Tympanic membrane, ear canal and external ear normal. There is no impacted cerumen.      Left Ear: Tympanic membrane, ear canal and external ear normal. There is no impacted cerumen.      Nose: Nose normal.      Mouth/Throat:      Mouth: Mucous membranes are moist.      Pharynx: Oropharynx is clear. No oropharyngeal exudate or posterior oropharyngeal erythema.   Eyes:      General: No scleral icterus.        Right eye: No discharge.         Left eye: No discharge.      Extraocular Movements: Extraocular movements intact.      Conjunctiva/sclera: Conjunctivae normal.      Pupils: Pupils are equal, round, and reactive to light.   Cardiovascular:      Rate and Rhythm: Normal rate and regular rhythm.      Pulses: Normal pulses.      Heart sounds: Normal heart sounds. No murmur heard.     No friction rub. No gallop.   Pulmonary:      Effort: Pulmonary effort is normal. No respiratory distress.      Breath sounds: Normal breath sounds. No stridor. No wheezing, rhonchi or rales.   Chest:      Chest wall: No tenderness.   Musculoskeletal:         General: Normal range of motion.   Skin:     General: Skin is warm and dry.   Neurological:      General: No focal deficit present.      Mental Status: She is alert and oriented to person, place, and time.

## 2025-04-07 ENCOUNTER — OFFICE VISIT (OUTPATIENT)
Dept: FAMILY MEDICINE CLINIC | Facility: CLINIC | Age: 40
End: 2025-04-07
Payer: COMMERCIAL

## 2025-04-07 VITALS
DIASTOLIC BLOOD PRESSURE: 88 MMHG | HEIGHT: 64 IN | WEIGHT: 157 LBS | SYSTOLIC BLOOD PRESSURE: 130 MMHG | RESPIRATION RATE: 16 BRPM | BODY MASS INDEX: 26.8 KG/M2 | TEMPERATURE: 97 F | HEART RATE: 60 BPM

## 2025-04-07 DIAGNOSIS — Z86.39 HISTORY OF OBESITY: Primary | ICD-10-CM

## 2025-04-07 DIAGNOSIS — J31.0 CHRONIC RHINITIS: ICD-10-CM

## 2025-04-07 PROCEDURE — 99214 OFFICE O/P EST MOD 30 MIN: CPT | Performed by: FAMILY MEDICINE

## 2025-04-07 RX ORDER — NALTREXONE HYDROCHLORIDE AND BUPROPION HYDROCHLORIDE 8; 90 MG/1; MG/1
2 TABLET, EXTENDED RELEASE ORAL 2 TIMES DAILY
Qty: 360 TABLET | Refills: 1 | Status: SHIPPED | OUTPATIENT
Start: 2025-04-07

## 2025-04-07 NOTE — PROGRESS NOTES
"Name: Dione Da Silva      : 1985      MRN: [id number]  Encounter Provider: Adni Modi DO  Encounter Date: 2025   Encounter department: [facility]  :  Assessment & Plan  History of obesity  Improving with contrave  If wants stop then wean as instructed in , then can forgo a f/u appt in 3m  If wants to stay on meds, then would see in 3-6m  Orders:  •  Naltrexone-buPROPion HCl ER (Contrave) 8-90 MG TB12; Take 2 tablets by mouth 2 (two) times a day 1 in am for 7days, then 1 bid for 7days, then 2 in am and 1 in pm for 7days, then 2 po bid thereafter    Chronic rhinitis  stable              History of Present Illness   HPI  On contrave  $99 per month through [facility]  No bad dreams  No seizures  No anxiety  Currently 2/ dosing   No more headaches  Plan is 10 more pounds possibly in another 3m  Lost wt initially with zepbound but was $$    Review of Systems   Psychiatric/Behavioral:  Negative for sleep disturbance. The patient is not nervous/anxious.        Objective   /88   Pulse 60   Temp (!) 97 °F (36.1 °C)   Resp 16   Ht 5' 4\" (1.626 m)   Wt 71.2 kg (157 lb)   BMI 26.95 kg/m²      Physical Exam  Vitals and nursing note reviewed.   Constitutional:       General: She is not in acute distress.     Appearance: She is well-developed. She is not ill-appearing.   HENT:      Head: Normocephalic.      Right Ear: Tympanic membrane normal.      Left Ear: Tympanic membrane normal.   Eyes:      General: No scleral icterus.     Conjunctiva/sclera: Conjunctivae normal.   Cardiovascular:      Rate and Rhythm: Normal rate and regular rhythm.      Heart sounds: No murmur heard.  Pulmonary:      Effort: Pulmonary effort is normal. No respiratory distress.      Breath sounds: No wheezing.   Abdominal:      Palpations: Abdomen is soft.   Musculoskeletal:         General: No deformity.      Cervical back: Neck supple.   Skin:     General: Skin is warm and dry.      Coloration: Skin " is not pale.   Neurological:      Mental Status: She is alert.      Motor: No weakness.      Gait: Gait normal.   Psychiatric:         Mood and Affect: Mood normal.         Behavior: Behavior normal.         Thought Content: Thought content normal.       Administrative Statements   I have spent a total time of 31 minutes in caring for this patient on the day of the visit/encounter including Risks and benefits of tx options, Instructions for management, Patient and family education, Importance of tx compliance, Risk factor reductions, Impressions, Counseling / Coordination of care, Documenting in the medical record, and Obtaining or reviewing history  .

## 2025-04-08 NOTE — ASSESSMENT & PLAN NOTE
Improving with contrave  If wants stop then wean as instructed in 3m, then can forgo a f/u appt in 3m  If wants to stay on meds, then would see in 3-6m  Orders:  •  Naltrexone-buPROPion HCl ER (Contrave) 8-90 MG TB12; Take 2 tablets by mouth 2 (two) times a day 1 in am for 7days, then 1 bid for 7days, then 2 in am and 1 in pm for 7days, then 2 po bid thereafter